# Patient Record
Sex: FEMALE | Race: WHITE | NOT HISPANIC OR LATINO | Employment: FULL TIME | URBAN - METROPOLITAN AREA
[De-identification: names, ages, dates, MRNs, and addresses within clinical notes are randomized per-mention and may not be internally consistent; named-entity substitution may affect disease eponyms.]

---

## 2021-10-02 ENCOUNTER — APPOINTMENT (OUTPATIENT)
Dept: CT IMAGING | Facility: HOSPITAL | Age: 61
End: 2021-10-02

## 2021-10-02 ENCOUNTER — APPOINTMENT (OUTPATIENT)
Dept: GENERAL RADIOLOGY | Facility: HOSPITAL | Age: 61
End: 2021-10-02

## 2021-10-02 ENCOUNTER — HOSPITAL ENCOUNTER (EMERGENCY)
Facility: HOSPITAL | Age: 61
Discharge: HOME | End: 2021-10-02
Attending: EMERGENCY MEDICINE | Admitting: EMERGENCY MEDICINE

## 2021-10-02 VITALS
RESPIRATION RATE: 16 BRPM | OXYGEN SATURATION: 99 % | TEMPERATURE: 98 F | SYSTOLIC BLOOD PRESSURE: 118 MMHG | DIASTOLIC BLOOD PRESSURE: 79 MMHG | HEART RATE: 84 BPM

## 2021-10-02 DIAGNOSIS — N39.0 URINARY TRACT INFECTION WITHOUT HEMATURIA, SITE UNSPECIFIED: ICD-10-CM

## 2021-10-02 DIAGNOSIS — U07.1 COVID-19: Primary | ICD-10-CM

## 2021-10-02 LAB
ALBUMIN SERPL-MCNC: 4.5 G/DL (ref 3.5–5.2)
ALBUMIN/GLOB SERPL: 1.5 G/DL
ALP SERPL-CCNC: 75 U/L (ref 39–117)
ALT SERPL W P-5'-P-CCNC: 21 U/L (ref 1–33)
ANION GAP SERPL CALCULATED.3IONS-SCNC: 12.4 MMOL/L (ref 5–15)
AST SERPL-CCNC: 25 U/L (ref 1–32)
B PARAPERT DNA SPEC QL NAA+PROBE: NOT DETECTED
B PERT DNA SPEC QL NAA+PROBE: NOT DETECTED
BACTERIA UR QL AUTO: ABNORMAL /HPF
BASOPHILS # BLD AUTO: 0.03 10*3/MM3 (ref 0–0.2)
BASOPHILS NFR BLD AUTO: 0.6 % (ref 0–1.5)
BILIRUB SERPL-MCNC: 0.2 MG/DL (ref 0–1.2)
BILIRUB UR QL STRIP: NEGATIVE
BUN SERPL-MCNC: 12 MG/DL (ref 8–23)
BUN/CREAT SERPL: 13 (ref 7–25)
C PNEUM DNA NPH QL NAA+NON-PROBE: NOT DETECTED
CALCIUM SPEC-SCNC: 9.4 MG/DL (ref 8.6–10.5)
CHLORIDE SERPL-SCNC: 102 MMOL/L (ref 98–107)
CLARITY UR: CLEAR
CO2 SERPL-SCNC: 21.6 MMOL/L (ref 22–29)
COLOR UR: ABNORMAL
CREAT SERPL-MCNC: 0.92 MG/DL (ref 0.57–1)
D-LACTATE SERPL-SCNC: 0.9 MMOL/L (ref 0.5–2)
DEPRECATED RDW RBC AUTO: 44.5 FL (ref 37–54)
EOSINOPHIL # BLD AUTO: 0.05 10*3/MM3 (ref 0–0.4)
EOSINOPHIL NFR BLD AUTO: 1 % (ref 0.3–6.2)
ERYTHROCYTE [DISTWIDTH] IN BLOOD BY AUTOMATED COUNT: 13.6 % (ref 12.3–15.4)
FLUAV SUBTYP SPEC NAA+PROBE: NOT DETECTED
FLUBV RNA ISLT QL NAA+PROBE: NOT DETECTED
GFR SERPL CREATININE-BSD FRML MDRD: 62 ML/MIN/1.73
GLOBULIN UR ELPH-MCNC: 3.1 GM/DL
GLUCOSE SERPL-MCNC: 96 MG/DL (ref 65–99)
GLUCOSE UR STRIP-MCNC: NEGATIVE MG/DL
HADV DNA SPEC NAA+PROBE: NOT DETECTED
HCOV 229E RNA SPEC QL NAA+PROBE: NOT DETECTED
HCOV HKU1 RNA SPEC QL NAA+PROBE: NOT DETECTED
HCOV NL63 RNA SPEC QL NAA+PROBE: NOT DETECTED
HCOV OC43 RNA SPEC QL NAA+PROBE: NOT DETECTED
HCT VFR BLD AUTO: 48.8 % (ref 34–46.6)
HGB BLD-MCNC: 16.1 G/DL (ref 12–15.9)
HGB UR QL STRIP.AUTO: ABNORMAL
HMPV RNA NPH QL NAA+NON-PROBE: NOT DETECTED
HPIV1 RNA SPEC QL NAA+PROBE: NOT DETECTED
HPIV2 RNA SPEC QL NAA+PROBE: NOT DETECTED
HPIV3 RNA NPH QL NAA+PROBE: NOT DETECTED
HPIV4 P GENE NPH QL NAA+PROBE: NOT DETECTED
HYALINE CASTS UR QL AUTO: ABNORMAL /LPF
IMM GRANULOCYTES # BLD AUTO: 0.03 10*3/MM3 (ref 0–0.05)
IMM GRANULOCYTES NFR BLD AUTO: 0.6 % (ref 0–0.5)
INR PPP: 0.91 (ref 0.9–1.1)
KETONES UR QL STRIP: ABNORMAL
LEUKOCYTE ESTERASE UR QL STRIP.AUTO: ABNORMAL
LYMPHOCYTES # BLD AUTO: 1.59 10*3/MM3 (ref 0.7–3.1)
LYMPHOCYTES NFR BLD AUTO: 31.2 % (ref 19.6–45.3)
M PNEUMO IGG SER IA-ACNC: NOT DETECTED
MCH RBC QN AUTO: 29.2 PG (ref 26.6–33)
MCHC RBC AUTO-ENTMCNC: 33 G/DL (ref 31.5–35.7)
MCV RBC AUTO: 88.4 FL (ref 79–97)
MONOCYTES # BLD AUTO: 0.77 10*3/MM3 (ref 0.1–0.9)
MONOCYTES NFR BLD AUTO: 15.1 % (ref 5–12)
NEUTROPHILS NFR BLD AUTO: 2.62 10*3/MM3 (ref 1.7–7)
NEUTROPHILS NFR BLD AUTO: 51.5 % (ref 42.7–76)
NITRITE UR QL STRIP: POSITIVE
NRBC BLD AUTO-RTO: 0 /100 WBC (ref 0–0.2)
NT-PROBNP SERPL-MCNC: 50.3 PG/ML (ref 0–900)
PH UR STRIP.AUTO: <=5 [PH] (ref 5–8)
PLATELET # BLD AUTO: 262 10*3/MM3 (ref 140–450)
PMV BLD AUTO: 10.2 FL (ref 6–12)
POTASSIUM SERPL-SCNC: 4.1 MMOL/L (ref 3.5–5.2)
PROCALCITONIN SERPL-MCNC: 0.04 NG/ML (ref 0–0.25)
PROT SERPL-MCNC: 7.6 G/DL (ref 6–8.5)
PROT UR QL STRIP: NEGATIVE
PROTHROMBIN TIME: 12.1 SECONDS (ref 11.7–14.2)
QT INTERVAL: 377 MS
RBC # BLD AUTO: 5.52 10*6/MM3 (ref 3.77–5.28)
RBC # UR: ABNORMAL /HPF
REF LAB TEST METHOD: ABNORMAL
RHINOVIRUS RNA SPEC NAA+PROBE: NOT DETECTED
RSV RNA NPH QL NAA+NON-PROBE: NOT DETECTED
SARS-COV-2 RNA NPH QL NAA+NON-PROBE: DETECTED
SODIUM SERPL-SCNC: 136 MMOL/L (ref 136–145)
SP GR UR STRIP: 1.01 (ref 1–1.03)
SQUAMOUS #/AREA URNS HPF: ABNORMAL /HPF
TROPONIN T SERPL-MCNC: <0.01 NG/ML (ref 0–0.03)
UROBILINOGEN UR QL STRIP: ABNORMAL
WBC # BLD AUTO: 5.09 10*3/MM3 (ref 3.4–10.8)
WBC UR QL AUTO: ABNORMAL /HPF

## 2021-10-02 PROCEDURE — 85610 PROTHROMBIN TIME: CPT | Performed by: EMERGENCY MEDICINE

## 2021-10-02 PROCEDURE — 71045 X-RAY EXAM CHEST 1 VIEW: CPT

## 2021-10-02 PROCEDURE — 83605 ASSAY OF LACTIC ACID: CPT | Performed by: EMERGENCY MEDICINE

## 2021-10-02 PROCEDURE — 96374 THER/PROPH/DIAG INJ IV PUSH: CPT

## 2021-10-02 PROCEDURE — 74176 CT ABD & PELVIS W/O CONTRAST: CPT

## 2021-10-02 PROCEDURE — 25010000002 KETOROLAC TROMETHAMINE PER 15 MG: Performed by: EMERGENCY MEDICINE

## 2021-10-02 PROCEDURE — 25010000002 MORPHINE PER 10 MG: Performed by: EMERGENCY MEDICINE

## 2021-10-02 PROCEDURE — 84484 ASSAY OF TROPONIN QUANT: CPT | Performed by: EMERGENCY MEDICINE

## 2021-10-02 PROCEDURE — 84145 PROCALCITONIN (PCT): CPT | Performed by: EMERGENCY MEDICINE

## 2021-10-02 PROCEDURE — 85025 COMPLETE CBC W/AUTO DIFF WBC: CPT | Performed by: EMERGENCY MEDICINE

## 2021-10-02 PROCEDURE — 25010000002 ONDANSETRON PER 1 MG: Performed by: EMERGENCY MEDICINE

## 2021-10-02 PROCEDURE — 81001 URINALYSIS AUTO W/SCOPE: CPT | Performed by: EMERGENCY MEDICINE

## 2021-10-02 PROCEDURE — 93010 ELECTROCARDIOGRAM REPORT: CPT | Performed by: INTERNAL MEDICINE

## 2021-10-02 PROCEDURE — 83880 ASSAY OF NATRIURETIC PEPTIDE: CPT | Performed by: EMERGENCY MEDICINE

## 2021-10-02 PROCEDURE — 0202U NFCT DS 22 TRGT SARS-COV-2: CPT | Performed by: EMERGENCY MEDICINE

## 2021-10-02 PROCEDURE — 96376 TX/PRO/DX INJ SAME DRUG ADON: CPT

## 2021-10-02 PROCEDURE — 93005 ELECTROCARDIOGRAM TRACING: CPT | Performed by: EMERGENCY MEDICINE

## 2021-10-02 PROCEDURE — 99283 EMERGENCY DEPT VISIT LOW MDM: CPT

## 2021-10-02 PROCEDURE — 96375 TX/PRO/DX INJ NEW DRUG ADDON: CPT

## 2021-10-02 PROCEDURE — 80053 COMPREHEN METABOLIC PANEL: CPT | Performed by: EMERGENCY MEDICINE

## 2021-10-02 RX ORDER — KETOROLAC TROMETHAMINE 15 MG/ML
15 INJECTION, SOLUTION INTRAMUSCULAR; INTRAVENOUS ONCE
Status: COMPLETED | OUTPATIENT
Start: 2021-10-02 | End: 2021-10-02

## 2021-10-02 RX ORDER — ONDANSETRON 4 MG/1
4 TABLET, ORALLY DISINTEGRATING ORAL EVERY 8 HOURS PRN
Qty: 9 TABLET | Refills: 0 | Status: SHIPPED | OUTPATIENT
Start: 2021-10-02 | End: 2022-04-05

## 2021-10-02 RX ORDER — SODIUM CHLORIDE 0.9 % (FLUSH) 0.9 %
10 SYRINGE (ML) INJECTION AS NEEDED
Status: DISCONTINUED | OUTPATIENT
Start: 2021-10-02 | End: 2021-10-02 | Stop reason: HOSPADM

## 2021-10-02 RX ORDER — ONDANSETRON 2 MG/ML
4 INJECTION INTRAMUSCULAR; INTRAVENOUS ONCE
Status: COMPLETED | OUTPATIENT
Start: 2021-10-02 | End: 2021-10-02

## 2021-10-02 RX ORDER — MORPHINE SULFATE 2 MG/ML
2 INJECTION, SOLUTION INTRAMUSCULAR; INTRAVENOUS ONCE
Status: COMPLETED | OUTPATIENT
Start: 2021-10-02 | End: 2021-10-02

## 2021-10-02 RX ORDER — MORPHINE SULFATE 2 MG/ML
4 INJECTION, SOLUTION INTRAMUSCULAR; INTRAVENOUS ONCE
Status: COMPLETED | OUTPATIENT
Start: 2021-10-02 | End: 2021-10-02

## 2021-10-02 RX ORDER — NAPROXEN 500 MG/1
500 TABLET ORAL 2 TIMES DAILY PRN
Qty: 20 TABLET | Refills: 0 | Status: SHIPPED | OUTPATIENT
Start: 2021-10-02 | End: 2022-04-05

## 2021-10-02 RX ORDER — CEPHALEXIN 500 MG/1
500 CAPSULE ORAL 2 TIMES DAILY
Qty: 14 CAPSULE | Refills: 0 | Status: SHIPPED | OUTPATIENT
Start: 2021-10-02 | End: 2021-10-09

## 2021-10-02 RX ADMIN — MORPHINE SULFATE 2 MG: 2 INJECTION, SOLUTION INTRAMUSCULAR; INTRAVENOUS at 02:29

## 2021-10-02 RX ADMIN — KETOROLAC TROMETHAMINE 15 MG: 15 INJECTION, SOLUTION INTRAMUSCULAR; INTRAVENOUS at 06:14

## 2021-10-02 RX ADMIN — MORPHINE SULFATE 4 MG: 2 INJECTION, SOLUTION INTRAMUSCULAR; INTRAVENOUS at 03:05

## 2021-10-02 RX ADMIN — SODIUM CHLORIDE 500 ML: 9 INJECTION, SOLUTION INTRAVENOUS at 02:30

## 2021-10-02 RX ADMIN — ONDANSETRON 4 MG: 2 INJECTION INTRAMUSCULAR; INTRAVENOUS at 02:30

## 2021-10-02 NOTE — ED NOTES
This RN in appropriate PPE for all patient care interactions. Pt masked and redirected for proper mask use when necessary. Hand hygiene performed before and after all patient care interactions.         Art Abrams, RN  10/02/21 0506

## 2021-10-02 NOTE — ED NOTES
Pt reports bilateral hip pain, bilateral leg pain, productive cough, blood in the urine, muscle aches, fever and chills. Pt was tested for covid yesterday and it was negative. PCP sent to ER to be evaluated. Pt is not vaccinated and was recently exposed to covid via her niece.      Sosa Sol, RN  10/02/21 0043       Sosa Sol RN  10/02/21 0048

## 2021-10-02 NOTE — ED PROVIDER NOTES
EMERGENCY DEPARTMENT ENCOUNTER    Room Number:  09/09  Date of encounter:  10/2/2021  PCP: Janell Dangelo APRN  Historian: Patient      HPI:  Chief Complaint: Body aches flank pain  A complete HPI/ROS/PMH/PSH/SH/FH are unobtainable due to: None    Context: Francisca Fields is a 60 y.o. female who presents to the ED c/o 2 days of left flank pain, body aches, chills, several days of cough that is minimally productive of green mucus states is like prior bronchitis.  Patient was exposed to Covid recently via her niece.  Had a negative Covid test yesterday.      PAST MEDICAL HISTORY  Active Ambulatory Problems     Diagnosis Date Noted   • No Active Ambulatory Problems     Resolved Ambulatory Problems     Diagnosis Date Noted   • No Resolved Ambulatory Problems     No Additional Past Medical History         PAST SURGICAL HISTORY  No past surgical history on file.      FAMILY HISTORY  No family history on file.      SOCIAL HISTORY  Social History     Socioeconomic History   • Marital status: Single     Spouse name: Not on file   • Number of children: Not on file   • Years of education: Not on file   • Highest education level: Not on file         ALLERGIES  Codeine        REVIEW OF SYSTEMS  Review of Systems     All systems reviewed and negative except for those discussed in HPI.       PHYSICAL EXAM    I have reviewed the triage vital signs and nursing notes.    ED Triage Vitals [10/02/21 0045]   Temp Heart Rate Resp BP SpO2   97.1 °F (36.2 °C) 82 19 -- 97 %      Temp src Heart Rate Source Patient Position BP Location FiO2 (%)   -- -- -- -- --       Physical Exam  GENERAL: Mild distressed  HENT: nares patent  EYES: no scleral icterus  CV: regular rhythm, regular rate  RESPIRATORY: normal effort, clear auscultation bilaterally  ABDOMEN: soft, left flank tenderness, nondistended  MUSCULOSKELETAL: no deformity  NEURO: alert, moves all extremities, follows commands  SKIN: warm, dry        LAB RESULTS  Recent Results  (from the past 24 hour(s))   ECG 12 Lead    Collection Time: 10/02/21  2:20 AM   Result Value Ref Range    QT Interval 377 ms   Comprehensive Metabolic Panel    Collection Time: 10/02/21  2:28 AM    Specimen: Blood   Result Value Ref Range    Glucose 96 65 - 99 mg/dL    BUN 12 8 - 23 mg/dL    Creatinine 0.92 0.57 - 1.00 mg/dL    Sodium 136 136 - 145 mmol/L    Potassium 4.1 3.5 - 5.2 mmol/L    Chloride 102 98 - 107 mmol/L    CO2 21.6 (L) 22.0 - 29.0 mmol/L    Calcium 9.4 8.6 - 10.5 mg/dL    Total Protein 7.6 6.0 - 8.5 g/dL    Albumin 4.50 3.50 - 5.20 g/dL    ALT (SGPT) 21 1 - 33 U/L    AST (SGOT) 25 1 - 32 U/L    Alkaline Phosphatase 75 39 - 117 U/L    Total Bilirubin 0.2 0.0 - 1.2 mg/dL    eGFR Non African Amer 62 >60 mL/min/1.73    Globulin 3.1 gm/dL    A/G Ratio 1.5 g/dL    BUN/Creatinine Ratio 13.0 7.0 - 25.0    Anion Gap 12.4 5.0 - 15.0 mmol/L   Protime-INR    Collection Time: 10/02/21  2:28 AM    Specimen: Blood   Result Value Ref Range    Protime 12.1 11.7 - 14.2 Seconds    INR 0.91 0.90 - 1.10   BNP    Collection Time: 10/02/21  2:28 AM    Specimen: Blood   Result Value Ref Range    proBNP 50.3 0.0 - 900.0 pg/mL   Troponin    Collection Time: 10/02/21  2:28 AM    Specimen: Blood   Result Value Ref Range    Troponin T <0.010 0.000 - 0.030 ng/mL   Lactic Acid, Plasma    Collection Time: 10/02/21  2:28 AM    Specimen: Blood   Result Value Ref Range    Lactate 0.9 0.5 - 2.0 mmol/L   Procalcitonin    Collection Time: 10/02/21  2:28 AM    Specimen: Blood   Result Value Ref Range    Procalcitonin 0.04 0.00 - 0.25 ng/mL   CBC Auto Differential    Collection Time: 10/02/21  2:28 AM    Specimen: Blood   Result Value Ref Range    WBC 5.09 3.40 - 10.80 10*3/mm3    RBC 5.52 (H) 3.77 - 5.28 10*6/mm3    Hemoglobin 16.1 (H) 12.0 - 15.9 g/dL    Hematocrit 48.8 (H) 34.0 - 46.6 %    MCV 88.4 79.0 - 97.0 fL    MCH 29.2 26.6 - 33.0 pg    MCHC 33.0 31.5 - 35.7 g/dL    RDW 13.6 12.3 - 15.4 %    RDW-SD 44.5 37.0 - 54.0 fl    MPV  10.2 6.0 - 12.0 fL    Platelets 262 140 - 450 10*3/mm3    Neutrophil % 51.5 42.7 - 76.0 %    Lymphocyte % 31.2 19.6 - 45.3 %    Monocyte % 15.1 (H) 5.0 - 12.0 %    Eosinophil % 1.0 0.3 - 6.2 %    Basophil % 0.6 0.0 - 1.5 %    Immature Grans % 0.6 (H) 0.0 - 0.5 %    Neutrophils, Absolute 2.62 1.70 - 7.00 10*3/mm3    Lymphocytes, Absolute 1.59 0.70 - 3.10 10*3/mm3    Monocytes, Absolute 0.77 0.10 - 0.90 10*3/mm3    Eosinophils, Absolute 0.05 0.00 - 0.40 10*3/mm3    Basophils, Absolute 0.03 0.00 - 0.20 10*3/mm3    Immature Grans, Absolute 0.03 0.00 - 0.05 10*3/mm3    nRBC 0.0 0.0 - 0.2 /100 WBC   Respiratory Panel PCR w/COVID-19(SARS-CoV-2) ADRIAN/SATYA/ADIEL/PAD/COR/MAD/SHANKAR In-House, NP Swab in UT/VTM, 3-4 HR TAT - Swab, Nasopharynx    Collection Time: 10/02/21  2:29 AM    Specimen: Nasopharynx; Swab   Result Value Ref Range    ADENOVIRUS, PCR      Coronavirus 229E      Coronavirus HKU1      Coronavirus NL63      Coronavirus OC43      COVID19 Detected (C) Not Detected - Ref. Range    Human Metapneumovirus      Human Rhinovirus/Enterovirus      Influenza B PCR      Parainfluenza Virus 1      Parainfluenza Virus 2      Parainfluenza Virus 3      Parainfluenza Virus 4      RSV, PCR      Bordetella pertussis pcr      Bordetella parapertussis PCR      Chlamydophila pneumoniae PCR      Mycoplasma pneumo by PCR     Urinalysis With Microscopic If Indicated (No Culture) - Urine, Clean Catch    Collection Time: 10/02/21  5:11 AM    Specimen: Urine, Clean Catch   Result Value Ref Range    Color, UA Orange (A) Yellow, Straw    Appearance, UA Clear Clear    pH, UA <=5.0 5.0 - 8.0    Specific Gravity, UA 1.012 1.005 - 1.030    Glucose, UA Negative Negative    Ketones, UA Trace (A) Negative    Bilirubin, UA Negative Negative    Blood, UA Trace (A) Negative    Protein, UA Negative Negative    Leuk Esterase, UA Trace (A) Negative    Nitrite, UA Positive (A) Negative    Urobilinogen, UA 1.0 E.U./dL 0.2 - 1.0 E.U./dL   Urinalysis, Microscopic  Only - Urine, Clean Catch    Collection Time: 10/02/21  5:11 AM    Specimen: Urine, Clean Catch   Result Value Ref Range    RBC, UA 0-2 None Seen, 0-2 /HPF    WBC, UA 3-5 (A) None Seen, 0-2 /HPF    Bacteria, UA None Seen None Seen /HPF    Squamous Epithelial Cells, UA 0-2 None Seen, 0-2 /HPF    Hyaline Casts, UA None Seen None Seen /LPF    Methodology Automated Microscopy        Ordered the above labs and independently reviewed the results.        RADIOLOGY  CT Abdomen Pelvis Without Contrast    Result Date: 10/2/2021  CT OF THE ABDOMEN AND PELVIS WITHOUT CONTRAST  HISTORY: Left flank pain  COMPARISON: None available.  TECHNIQUE: Axial CT imaging was obtained through the abdomen and pelvis. No IV contrast was administered.  FINDINGS: Images through the lung bases demonstrate some bibasilar groundglass infiltrates. These are in a dependent distribution, which would be more suggestive of atelectasis. No acute osseous abnormalities are seen. Images are degraded by motion artifact. Stomach and duodenum are unremarkable, as are the gallbladder, liver, adrenal glands, spleen, and pancreas. There is no hydronephrosis. No distal ureteral or bladder stones are seen. Uterus and ovaries appear normal. There is colonic diverticulosis. No diverticulitis is seen. There is no evidence of appendicitis. There is no bowel obstruction.      No acute intra-abdominal or intrapelvic process identified.  Radiation dose reduction techniques were utilized, including automated exposure control and exposure modulation based on body size.  This report was finalized on 10/2/2021 4:19 AM by Dr. Rosa Isela Pierre M.D.      XR Chest 1 View    Result Date: 10/2/2021  SINGLE VIEW OF THE CHEST  HISTORY: Shortness of air  COMPARISON: None available.  FINDINGS: Heart size is within normal limits. There is mild bibasilar atelectasis. No pneumothorax, pleural effusion, or acute infiltrate is seen.      Mild bibasilar atelectasis, more significant on the  left.  This report was finalized on 10/2/2021 2:17 AM by Dr. Rosa Isela Pierre M.D.        I ordered the above noted radiological studies. Reviewed by me and discussed with radiologist.  See dictation for official radiology interpretation.      PROCEDURES    Procedures      MEDICATIONS GIVEN IN ER    Medications   sodium chloride 0.9 % flush 10 mL (has no administration in time range)   morphine injection 2 mg (2 mg Intravenous Given 10/2/21 0229)   ondansetron (ZOFRAN) injection 4 mg (4 mg Intravenous Given 10/2/21 0230)   sodium chloride 0.9 % bolus 500 mL (0 mL Intravenous Stopped 10/2/21 0307)   morphine injection 4 mg (4 mg Intravenous Given 10/2/21 0305)   ketorolac (TORADOL) injection 15 mg (15 mg Intravenous Given 10/2/21 0614)         PROGRESS, DATA ANALYSIS, CONSULTS, AND MEDICAL DECISION MAKING    All labs have been independently reviewed by me.  All radiology studies have been reviewed by me and discussed with radiologist dictating the report.   EKG's independently viewed and interpreted by me.  Discussion below represents my analysis of pertinent findings related to patient's condition, differential diagnosis, treatment plan and final disposition.        ED Course as of Oct 02 0625   Sat Oct 02, 2021   0222 EKG          EKG time: 0 220  Rhythm/Rate: Sinus rhythm rate 78  P waves and SC: Normal  QRS, axis: Narrow regular  ST and T waves: Normal    Interpreted Contemporaneously by me, independently viewed    [TJ]      ED Course User Index  [TJ] Paco Monson MD           PPE: The patient wore a surgical mask throughout the entire patient encounter. I wore an N95.    AS OF 06:25 EDT VITALS:    BP - 117/76  HR - 82  TEMP - 97.1 °F (36.2 °C)  O2 SATS - 97%        DIAGNOSIS  Final diagnoses:   COVID-19   Urinary tract infection without hematuria, site unspecified         DISPOSITION  Discharge           Paco Monson MD  10/02/21 0625

## 2022-04-05 ENCOUNTER — OFFICE VISIT (OUTPATIENT)
Dept: FAMILY MEDICINE CLINIC | Facility: CLINIC | Age: 62
End: 2022-04-05

## 2022-04-05 VITALS
RESPIRATION RATE: 19 BRPM | TEMPERATURE: 97.5 F | WEIGHT: 172 LBS | DIASTOLIC BLOOD PRESSURE: 72 MMHG | OXYGEN SATURATION: 98 % | HEART RATE: 100 BPM | SYSTOLIC BLOOD PRESSURE: 120 MMHG | HEIGHT: 62 IN | BODY MASS INDEX: 31.65 KG/M2

## 2022-04-05 DIAGNOSIS — F17.200 SMOKER: ICD-10-CM

## 2022-04-05 DIAGNOSIS — Z00.00 ANNUAL PHYSICAL EXAM: ICD-10-CM

## 2022-04-05 DIAGNOSIS — M19.90 ARTHRITIS: ICD-10-CM

## 2022-04-05 DIAGNOSIS — E66.9 OBESITY (BMI 30.0-34.9): ICD-10-CM

## 2022-04-05 DIAGNOSIS — H61.21 IMPACTED CERUMEN OF RIGHT EAR: ICD-10-CM

## 2022-04-05 DIAGNOSIS — M51.36 DEGENERATIVE DISC DISEASE, LUMBAR: ICD-10-CM

## 2022-04-05 DIAGNOSIS — K76.0 FATTY LIVER: ICD-10-CM

## 2022-04-05 DIAGNOSIS — Z76.89 ENCOUNTER TO ESTABLISH CARE: Primary | ICD-10-CM

## 2022-04-05 PROCEDURE — 69209 REMOVE IMPACTED EAR WAX UNI: CPT | Performed by: STUDENT IN AN ORGANIZED HEALTH CARE EDUCATION/TRAINING PROGRAM

## 2022-04-05 PROCEDURE — 2014F MENTAL STATUS ASSESS: CPT | Performed by: STUDENT IN AN ORGANIZED HEALTH CARE EDUCATION/TRAINING PROGRAM

## 2022-04-05 PROCEDURE — 99386 PREV VISIT NEW AGE 40-64: CPT | Performed by: STUDENT IN AN ORGANIZED HEALTH CARE EDUCATION/TRAINING PROGRAM

## 2022-04-05 PROCEDURE — 3008F BODY MASS INDEX DOCD: CPT | Performed by: STUDENT IN AN ORGANIZED HEALTH CARE EDUCATION/TRAINING PROGRAM

## 2022-04-05 NOTE — PROGRESS NOTES
"Chief Complaint  Establishing care/annual physical    Subjective         Francisca Fields is a 61 y.o. female who presents to [facility]  61 years old female with past medical history of lower back pain, smoking, fatty liver, arthritis, obesity comes to the clinic today to establish care and annual physical.    Current smoker; patient declined low-dose CT for lung cancer screening.    Reports colonoscopy was done 3 years ago, does not want to continue with colonoscopy.  Does not want any mammogram/Pap smear or any other preventive cares.    Lower back pain; following up with pain management at [place].    Denies any chest pain or shortness of breath at rest, patient is physically very active.    Review of Systems   Objective   Vital Signs:   Vitals:    04/05/22 1420   BP: 120/72   Pulse: 100   Resp: 19   Temp: 97.5 °F (36.4 °C)   SpO2: 98%   Weight: 78 kg (172 lb)   Height: 157.5 cm (62\")      Body mass index is 31.46 kg/m².   Physical Exam  Vitals reviewed.   Constitutional:       Appearance: Normal appearance. She is well-developed.   HENT:      Head: Normocephalic and atraumatic.      Right Ear: External ear normal.      Left Ear: External ear normal.      Mouth/Throat:      Pharynx: No oropharyngeal exudate.   Eyes:      Conjunctiva/sclera: Conjunctivae normal.      Pupils: Pupils are equal, round, and reactive to light.   Cardiovascular:      Rate and Rhythm: Normal rate and regular rhythm.      Heart sounds: No murmur heard.    No friction rub. No gallop.   Pulmonary:      Effort: Pulmonary effort is normal.      Breath sounds: Normal breath sounds. No wheezing or rhonchi.   Abdominal:      General: Bowel sounds are normal. There is no distension.      Palpations: Abdomen is soft.      Tenderness: There is no abdominal tenderness.   Skin:     General: Skin is warm and dry.   Neurological:      Mental Status: She is alert and oriented to person, place, and time.      " Cranial Nerves: No cranial nerve deficit.   Psychiatric:         Mood and Affect: Mood and affect normal.         Behavior: Behavior normal.         Thought Content: Thought content normal.         Judgment: Judgment normal.              Ear Cerumen Removal    Date/Time: 4/5/2022 3:03 PM  Performed by: Arian Ching MD  Authorized by: Arian Ching MD     Anesthesia:  Local Anesthetic: none  Location details: right ear  Procedure type: irrigation   Sedation:  Patient sedated: no               Assessment and Plan   Diagnoses and all orders for this visit:    1. Encounter to establish care (Primary)  -     TSH Rfx On Abnormal To Free T4; Future  -     CBC & Differential; Future  -     Comprehensive Metabolic Panel; Future  -     Hemoglobin A1c; Future  -     Lipid Panel; Future    2. Annual physical exam  Comments:  declined to c/w preventive care,   Orders:  -     TSH Rfx On Abnormal To Free T4; Future  -     CBC & Differential; Future  -     Comprehensive Metabolic Panel; Future  -     Hemoglobin A1c; Future  -     Lipid Panel; Future    3. Degenerative disc disease, lumbar  -     TSH Rfx On Abnormal To Free T4; Future  -     CBC & Differential; Future  -     Comprehensive Metabolic Panel; Future  -     Hemoglobin A1c; Future  -     Lipid Panel; Future    4. Arthritis  -     TSH Rfx On Abnormal To Free T4; Future  -     CBC & Differential; Future  -     Comprehensive Metabolic Panel; Future  -     Hemoglobin A1c; Future  -     Lipid Panel; Future    5. Fatty liver  -     TSH Rfx On Abnormal To Free T4; Future  -     CBC & Differential; Future  -     Comprehensive Metabolic Panel; Future  -     Hemoglobin A1c; Future  -     Lipid Panel; Future    6. Smoker  Comments:  declined LDCT screening; risks discussed   Orders:  -     TSH Rfx On Abnormal To Free T4; Future  -     CBC & Differential; Future  -     Comprehensive Metabolic Panel; Future  -     Hemoglobin A1c; Future  -     Lipid Panel; Future    7. Obesity (BMI  30.0-34.9)  -     TSH Rfx On Abnormal To Free T4; Future  -     CBC & Differential; Future  -     Comprehensive Metabolic Panel; Future  -     Hemoglobin A1c; Future  -     Lipid Panel; Future    8. Impacted cerumen of right ear  Comments:  Irrigation performed    Other orders  -     Ear Cerumen Removal            Follow Up   Return in about 6 months (around 10/5/2022) for Recheck, Next scheduled follow up.  Patient was given instructions and counseling regarding her condition or for health maintenance advice. Please see specific information pulled into the AVS if appropriate.

## 2022-04-20 ENCOUNTER — LAB (OUTPATIENT)
Dept: LAB | Facility: HOSPITAL | Age: 62
End: 2022-04-20

## 2022-04-20 DIAGNOSIS — M51.36 DEGENERATIVE DISC DISEASE, LUMBAR: ICD-10-CM

## 2022-04-20 DIAGNOSIS — K76.0 FATTY LIVER: ICD-10-CM

## 2022-04-20 DIAGNOSIS — M19.90 ARTHRITIS: ICD-10-CM

## 2022-04-20 DIAGNOSIS — E66.9 OBESITY (BMI 30.0-34.9): ICD-10-CM

## 2022-04-20 DIAGNOSIS — Z00.00 ANNUAL PHYSICAL EXAM: ICD-10-CM

## 2022-04-20 DIAGNOSIS — Z76.89 ENCOUNTER TO ESTABLISH CARE: ICD-10-CM

## 2022-04-20 DIAGNOSIS — F17.200 SMOKER: ICD-10-CM

## 2022-04-20 LAB
ALBUMIN SERPL-MCNC: 4.2 G/DL (ref 3.5–5.2)
ALBUMIN/GLOB SERPL: 1.3 G/DL
ALP SERPL-CCNC: 75 U/L (ref 39–117)
ALT SERPL W P-5'-P-CCNC: 20 U/L (ref 1–33)
ANION GAP SERPL CALCULATED.3IONS-SCNC: 8.4 MMOL/L (ref 5–15)
AST SERPL-CCNC: 16 U/L (ref 1–32)
BASOPHILS # BLD AUTO: 0.04 10*3/MM3 (ref 0–0.2)
BASOPHILS NFR BLD AUTO: 0.5 % (ref 0–1.5)
BILIRUB SERPL-MCNC: 0.2 MG/DL (ref 0–1.2)
BUN SERPL-MCNC: 19 MG/DL (ref 8–23)
BUN/CREAT SERPL: 22.4 (ref 7–25)
CALCIUM SPEC-SCNC: 9.3 MG/DL (ref 8.6–10.5)
CHLORIDE SERPL-SCNC: 107 MMOL/L (ref 98–107)
CHOLEST SERPL-MCNC: 198 MG/DL (ref 0–200)
CO2 SERPL-SCNC: 22.6 MMOL/L (ref 22–29)
CREAT SERPL-MCNC: 0.85 MG/DL (ref 0.57–1)
DEPRECATED RDW RBC AUTO: 40.4 FL (ref 37–54)
EGFRCR SERPLBLD CKD-EPI 2021: 78.1 ML/MIN/1.73
EOSINOPHIL # BLD AUTO: 0.29 10*3/MM3 (ref 0–0.4)
EOSINOPHIL NFR BLD AUTO: 3.5 % (ref 0.3–6.2)
ERYTHROCYTE [DISTWIDTH] IN BLOOD BY AUTOMATED COUNT: 13 % (ref 12.3–15.4)
GLOBULIN UR ELPH-MCNC: 3.3 GM/DL
GLUCOSE SERPL-MCNC: 85 MG/DL (ref 65–99)
HBA1C MFR BLD: 5.9 % (ref 4.8–5.6)
HCT VFR BLD AUTO: 42.9 % (ref 34–46.6)
HDLC SERPL-MCNC: 65 MG/DL (ref 40–60)
HGB BLD-MCNC: 14.2 G/DL (ref 12–15.9)
IMM GRANULOCYTES # BLD AUTO: 0.04 10*3/MM3 (ref 0–0.05)
IMM GRANULOCYTES NFR BLD AUTO: 0.5 % (ref 0–0.5)
LDLC SERPL CALC-MCNC: 110 MG/DL (ref 0–100)
LDLC/HDLC SERPL: 1.64 {RATIO}
LYMPHOCYTES # BLD AUTO: 2.69 10*3/MM3 (ref 0.7–3.1)
LYMPHOCYTES NFR BLD AUTO: 32.2 % (ref 19.6–45.3)
MCH RBC QN AUTO: 28.6 PG (ref 26.6–33)
MCHC RBC AUTO-ENTMCNC: 33.1 G/DL (ref 31.5–35.7)
MCV RBC AUTO: 86.5 FL (ref 79–97)
MONOCYTES # BLD AUTO: 0.72 10*3/MM3 (ref 0.1–0.9)
MONOCYTES NFR BLD AUTO: 8.6 % (ref 5–12)
NEUTROPHILS NFR BLD AUTO: 4.58 10*3/MM3 (ref 1.7–7)
NEUTROPHILS NFR BLD AUTO: 54.7 % (ref 42.7–76)
NRBC BLD AUTO-RTO: 0 /100 WBC (ref 0–0.2)
PLATELET # BLD AUTO: 324 10*3/MM3 (ref 140–450)
PMV BLD AUTO: 10.4 FL (ref 6–12)
POTASSIUM SERPL-SCNC: 4.7 MMOL/L (ref 3.5–5.2)
PROT SERPL-MCNC: 7.5 G/DL (ref 6–8.5)
RBC # BLD AUTO: 4.96 10*6/MM3 (ref 3.77–5.28)
SODIUM SERPL-SCNC: 138 MMOL/L (ref 136–145)
TRIGL SERPL-MCNC: 133 MG/DL (ref 0–150)
TSH SERPL DL<=0.05 MIU/L-ACNC: 2.7 UIU/ML (ref 0.27–4.2)
VLDLC SERPL-MCNC: 23 MG/DL (ref 5–40)
WBC NRBC COR # BLD: 8.36 10*3/MM3 (ref 3.4–10.8)

## 2022-04-20 PROCEDURE — 84443 ASSAY THYROID STIM HORMONE: CPT

## 2022-04-20 PROCEDURE — 80053 COMPREHEN METABOLIC PANEL: CPT

## 2022-04-20 PROCEDURE — 36415 COLL VENOUS BLD VENIPUNCTURE: CPT

## 2022-04-20 PROCEDURE — 83036 HEMOGLOBIN GLYCOSYLATED A1C: CPT

## 2022-04-20 PROCEDURE — 80061 LIPID PANEL: CPT

## 2022-04-20 PROCEDURE — 85025 COMPLETE CBC W/AUTO DIFF WBC: CPT

## 2022-05-17 ENCOUNTER — OFFICE VISIT (OUTPATIENT)
Dept: FAMILY MEDICINE CLINIC | Facility: CLINIC | Age: 62
End: 2022-05-17

## 2022-05-17 VITALS
RESPIRATION RATE: 18 BRPM | HEIGHT: 62 IN | WEIGHT: 178.8 LBS | BODY MASS INDEX: 32.9 KG/M2 | OXYGEN SATURATION: 99 % | TEMPERATURE: 97.8 F | DIASTOLIC BLOOD PRESSURE: 62 MMHG | SYSTOLIC BLOOD PRESSURE: 122 MMHG | HEART RATE: 85 BPM

## 2022-05-17 DIAGNOSIS — E66.9 OBESITY (BMI 30.0-34.9): ICD-10-CM

## 2022-05-17 DIAGNOSIS — R39.9 LOWER URINARY TRACT SYMPTOMS (LUTS): Primary | ICD-10-CM

## 2022-05-17 DIAGNOSIS — Z79.899 MEDICATION MANAGEMENT: ICD-10-CM

## 2022-05-17 LAB
AMPHET+METHAMPHET UR QL: NEGATIVE
AMPHETAMINE INTERNAL CONTROL: ABNORMAL
AMPHETAMINES UR QL: NEGATIVE
BARBITURATE INTERNAL CONTROL: ABNORMAL
BARBITURATES UR QL SCN: NEGATIVE
BENZODIAZ UR QL SCN: NEGATIVE
BENZODIAZEPINE INTERNAL CONTROL: ABNORMAL
BILIRUB BLD-MCNC: NEGATIVE MG/DL
BUPRENORPHINE INTERNAL CONTROL: ABNORMAL
BUPRENORPHINE SERPL-MCNC: NEGATIVE NG/ML
CANNABINOIDS SERPL QL: NEGATIVE
CLARITY, POC: CLEAR
COCAINE INTERNAL CONTROL: ABNORMAL
COCAINE UR QL: NEGATIVE
COLOR UR: YELLOW
EXPIRATION DATE: NORMAL
GLUCOSE UR STRIP-MCNC: NEGATIVE MG/DL
KETONES UR QL: NEGATIVE
LEUKOCYTE EST, POC: NEGATIVE
Lab: NORMAL
MDMA (ECSTASY) INTERNAL CONTROL: ABNORMAL
MDMA UR QL SCN: NEGATIVE
METHADONE INTERNAL CONTROL: ABNORMAL
METHADONE UR QL SCN: NEGATIVE
METHAMPHETAMINE INTERNAL CONTROL: ABNORMAL
NITRITE UR-MCNC: NEGATIVE MG/ML
OPIATES INTERNAL CONTROL: ABNORMAL
OPIATES UR QL: NEGATIVE
OXYCODONE INTERNAL CONTROL: ABNORMAL
OXYCODONE UR QL SCN: NEGATIVE
PCP UR QL SCN: NEGATIVE
PH UR: 5.5 [PH] (ref 5–8)
PHENCYCLIDINE INTERNAL CONTROL: ABNORMAL
PROT UR STRIP-MCNC: NEGATIVE MG/DL
RBC # UR STRIP: NEGATIVE /UL
SP GR UR: 1.02 (ref 1–1.03)
THC INTERNAL CONTROL: ABNORMAL
UROBILINOGEN UR QL: NORMAL

## 2022-05-17 PROCEDURE — 99214 OFFICE O/P EST MOD 30 MIN: CPT | Performed by: STUDENT IN AN ORGANIZED HEALTH CARE EDUCATION/TRAINING PROGRAM

## 2022-05-17 PROCEDURE — 80305 DRUG TEST PRSMV DIR OPT OBS: CPT | Performed by: STUDENT IN AN ORGANIZED HEALTH CARE EDUCATION/TRAINING PROGRAM

## 2022-05-17 RX ORDER — PHENAZOPYRIDINE HYDROCHLORIDE 100 MG/1
100 TABLET, FILM COATED ORAL 3 TIMES DAILY PRN
Qty: 6 TABLET | Refills: 0 | Status: SHIPPED | OUTPATIENT
Start: 2022-05-17 | End: 2022-10-05

## 2022-05-17 RX ORDER — PHENTERMINE HYDROCHLORIDE 15 MG/1
15 CAPSULE ORAL EVERY MORNING
Qty: 30 CAPSULE | Refills: 1 | Status: SHIPPED | OUTPATIENT
Start: 2022-05-17 | End: 2022-10-05

## 2022-05-17 NOTE — PROGRESS NOTES
"Chief Complaint  Bladder irritation/obesity    Subjective         Francisca Fields is a 61 y.o. female who presents to [facility]  61 years old female comes to the clinic today to discuss new concerns.    Patient is complaining of 3 to 4 days history of mild lower urinary tract symptoms, increased frequency and mild burning.  Denies any discharge or any other symptoms.    Patient has used Azo over the weekend which improved her symptoms somewhat.    Patient also wants to discuss about obesity, has tried phentermine.  Last phentermine use was last year.  Patient wants to go back on phentermine.  Patient does report that she is aware of side effects of phentermine.  But her weight gain is causing lots of distress and anxiety, patient would like to try phentermine low-dose to help with weight and suppress appetite.    Review of Systems   Objective   Vital Signs:   Vitals:    05/17/22 0740   BP: 122/62   Pulse: 85   Resp: 18   Temp: 97.8 °F (36.6 °C)   SpO2: 99%   Weight: 81.1 kg (178 lb 12.8 oz)   Height: 157.5 cm (62\")      Body mass index is 32.7 kg/m².   Physical Exam  Constitutional:       General: She is awake.   Cardiovascular:      Rate and Rhythm: Normal rate and regular rhythm.   Abdominal:      Palpations: Abdomen is soft.      Tenderness: There is no abdominal tenderness.   Neurological:      Mental Status: She is alert.                       Assessment and Plan   Diagnoses and all orders for this visit:    1. Lower urinary tract symptoms (LUTS) (Primary)  Comments:  Normal urine analysis, will send Pyridium to help with irritation.  Recheck of urine needed if persistent symptoms  Orders:  -     POCT urinalysis dipstick, automated  -     Cancel: Urine Culture - Urine, Urine, Clean Catch  -     phenazopyridine (Pyridium) 100 MG tablet; Take 1 tablet by mouth 3 (Three) Times a Day As Needed for Bladder Spasms.  Dispense: 6 tablet; Refill: 0  -     POC Urine Drug Screen " [facility]    2. Obesity (BMI 30.0-34.9)  Comments:  Lifestyle modifications discussed.  Phentermine side effect including hypertrophic cardiomyopathy discussed.  Orders:  -     phenazopyridine (Pyridium) 100 MG tablet; Take 1 tablet by mouth 3 (Three) Times a Day As Needed for Bladder Spasms.  Dispense: 6 tablet; Refill: 0  -     POC Urine Drug Screen [facility]  -     phentermine 15 MG capsule; Take 1 capsule by mouth Every Morning.  Dispense: 30 capsule; Refill: 1    3. Medication management  -     phenazopyridine (Pyridium) 100 MG tablet; Take 1 tablet by mouth 3 (Three) Times a Day As Needed for Bladder Spasms.  Dispense: 6 tablet; Refill: 0  -     POC Urine Drug Screen [facility]  -     phentermine 15 MG capsule; Take 1 capsule by mouth Every Morning.  Dispense: 30 capsule; Refill: 1        Due to patient's age, I have recommended against using stimulant medications.  Due to significant distress patient is willing to take that risk.  We will start patient on phentermine low-dose, will not go on high-dose.  Only for short-term, no plan to reuse it in future.  Barak reviewed/contract signed/UDS reviewed.    Follow Up   Return in about 2 months (around 7/17/2022) for Recheck.  Patient was given instructions and counseling regarding her condition or for health maintenance advice. Please see specific information pulled into the AVS if appropriate.

## 2022-09-09 ENCOUNTER — TELEPHONE (OUTPATIENT)
Dept: FAMILY MEDICINE CLINIC | Facility: CLINIC | Age: 62
End: 2022-09-09

## 2022-09-09 DIAGNOSIS — Z12.11 SCREENING FOR COLON CANCER: Primary | ICD-10-CM

## 2022-10-05 ENCOUNTER — OFFICE VISIT (OUTPATIENT)
Dept: FAMILY MEDICINE CLINIC | Facility: CLINIC | Age: 62
End: 2022-10-05

## 2022-10-05 VITALS
BODY MASS INDEX: 32.2 KG/M2 | HEIGHT: 62 IN | SYSTOLIC BLOOD PRESSURE: 132 MMHG | OXYGEN SATURATION: 98 % | DIASTOLIC BLOOD PRESSURE: 76 MMHG | WEIGHT: 175 LBS | HEART RATE: 61 BPM | TEMPERATURE: 96.2 F

## 2022-10-05 DIAGNOSIS — M19.90 ARTHRITIS: Primary | ICD-10-CM

## 2022-10-05 DIAGNOSIS — F17.200 SMOKING: ICD-10-CM

## 2022-10-05 DIAGNOSIS — M25.60 STIFFNESS IN JOINT: ICD-10-CM

## 2022-10-05 DIAGNOSIS — E66.9 OBESITY (BMI 30.0-34.9): ICD-10-CM

## 2022-10-05 DIAGNOSIS — G89.29 CHRONIC BILATERAL LOW BACK PAIN WITHOUT SCIATICA: ICD-10-CM

## 2022-10-05 DIAGNOSIS — M54.50 CHRONIC BILATERAL LOW BACK PAIN WITHOUT SCIATICA: ICD-10-CM

## 2022-10-05 PROCEDURE — 99214 OFFICE O/P EST MOD 30 MIN: CPT | Performed by: STUDENT IN AN ORGANIZED HEALTH CARE EDUCATION/TRAINING PROGRAM

## 2022-10-05 RX ORDER — VARENICLINE TARTRATE 1 MG/1
1 TABLET, FILM COATED ORAL 2 TIMES DAILY
Qty: 56 TABLET | Refills: 1 | Status: SHIPPED | OUTPATIENT
Start: 2022-11-02 | End: 2022-10-26

## 2022-10-05 NOTE — PROGRESS NOTES
"Chief Complaint  Follow-up (6 month follow up ), arthritis, lower back pain    Subjective         Francisca Fields is a 61 y.o. female who presents to [facility]    61 years old female comes to the clinic today to follow-up.    Obesity; working hard with diet and exercise to lose weight.    Arthritis/joint stiffness; chronic, would like to try something to help with arthritis.  Has tried Motrin at home with somewhat good improvement.    Chronic back pain; have seen specialist but does not want any interventions.    Current smoker, would like to try Chantix.    Declined low-dose CT/mammogram.    Denies any chest pain or shortness of breath on exertion.    Objective   Vital Signs:   Vitals:    10/05/22 0823   BP: 132/76   BP Location: Left arm   Patient Position: Sitting   Cuff Size: Adult   Pulse: 61   Temp: 96.2 °F (35.7 °C)   TempSrc: Temporal   SpO2: 98%   Weight: 79.4 kg (175 lb)   Height: 157.5 cm (62\")      Body mass index is 32.01 kg/m².   Wt Readings from Last 3 Encounters:   10/05/22 79.4 kg (175 lb)   05/17/22 81.1 kg (178 lb 12.8 oz)   04/05/22 78 kg (172 lb)      BP Readings from Last 3 Encounters:   10/05/22 132/76   05/17/22 122/62   04/05/22 120/72        Patient Care Team:  Arian Ching MD as PCP - General (Family Medicine)     Physical Exam  Vitals reviewed.   Constitutional:       Appearance: Normal appearance. She is well-developed.   HENT:      Head: Normocephalic and atraumatic.      Right Ear: External ear normal.      Left Ear: External ear normal.      Mouth/Throat:      Pharynx: No oropharyngeal exudate.   Eyes:      Conjunctiva/sclera: Conjunctivae normal.      Pupils: Pupils are equal, round, and reactive to light.   Cardiovascular:      Rate and Rhythm: Normal rate and regular rhythm.      Heart sounds: No murmur heard.    No friction rub. No gallop.   Pulmonary:      Effort: Pulmonary effort is normal.      Breath sounds: Normal breath sounds. No " wheezing or rhonchi.   Abdominal:      General: Bowel sounds are normal. There is no distension.      Palpations: Abdomen is soft.      Tenderness: There is no abdominal tenderness.   Skin:     General: Skin is warm and dry.   Neurological:      Mental Status: She is alert and oriented to person, place, and time.      Cranial Nerves: No cranial nerve deficit.   Psychiatric:         Mood and Affect: Mood and affect normal.         Behavior: Behavior normal.         Thought Content: Thought content normal.         Judgment: Judgment normal.                       Assessment and Plan   Diagnoses and all orders for this visit:    1. Arthritis (Primary)  -     diclofenac (VOLTAREN) 50 MG EC tablet; Take 1 tablet by mouth 2 (Two) Times a Day As Needed (pain).  Dispense: 60 tablet; Refill: 1    2. Stiffness in joint  -     diclofenac (VOLTAREN) 50 MG EC tablet; Take 1 tablet by mouth 2 (Two) Times a Day As Needed (pain).  Dispense: 60 tablet; Refill: 1    3. Smoking  -     varenicline (CHANTIX TRIP) 0.5 MG X 11 & 1 MG X 42 tablet; Take 0.5 mg po daily x 3 days, then 0.5 mg po bid x 4 days, then 1 mg po bid  Dispense: 53 tablet; Refill: 0  -     varenicline (Chantix Continuing Month Trip) 1 MG tablet; Take 1 tablet by mouth 2 (Two) Times a Day for 56 days.  Dispense: 56 tablet; Refill: 1    4. Chronic bilateral low back pain without sciatica    5. Obesity (BMI 30.0-34.9)    Smoking cessation discussed, will start patient on Chantix.    We will start patient on diclofenac, we will order autoimmune and rheumatoid work-up with next blood work.    Pain management offered but declined at this time.    Declined mammogram and low-dose CT      Tobacco Use: High Risk   • Smoking Tobacco Use: Current Some Day Smoker   • Smokeless Tobacco Use: Never Used            Follow Up   Return in about 3 months (around 1/5/2023) for Next scheduled follow up.  Patient was given instructions and counseling regarding her condition or for health  maintenance advice. Please see specific information pulled into the AVS if appropriate.

## 2022-10-25 ENCOUNTER — TELEPHONE (OUTPATIENT)
Dept: FAMILY MEDICINE CLINIC | Facility: CLINIC | Age: 62
End: 2022-10-25

## 2022-10-25 NOTE — TELEPHONE ENCOUNTER
Caller: Francisca Fields    Relationship: Self    Best call back number: [phone]    What medication are you requesting: SOMETHING FOR A UT    What are your current symptoms: BURNING, FREQUENCY TO GO     How long have you been experiencing symptoms: FOR A COUPLE OF DAYS NOW.     Have you had these symptoms before:    [x] Yes  [] No    Have you been treated for these symptoms before:   [x] Yes  [] No    If a prescription is needed, what is your preferred pharmacy and phone number:  [id number] - [place], [place] - [address] AT [facility] [phone] [facility] [phone]   [phone]

## 2022-10-26 ENCOUNTER — OFFICE VISIT (OUTPATIENT)
Dept: FAMILY MEDICINE CLINIC | Facility: CLINIC | Age: 62
End: 2022-10-26

## 2022-10-26 ENCOUNTER — TELEPHONE (OUTPATIENT)
Dept: FAMILY MEDICINE CLINIC | Facility: CLINIC | Age: 62
End: 2022-10-26

## 2022-10-26 VITALS
BODY MASS INDEX: 32.2 KG/M2 | TEMPERATURE: 97.8 F | HEIGHT: 62 IN | SYSTOLIC BLOOD PRESSURE: 130 MMHG | OXYGEN SATURATION: 99 % | RESPIRATION RATE: 19 BRPM | WEIGHT: 175 LBS | DIASTOLIC BLOOD PRESSURE: 74 MMHG | HEART RATE: 85 BPM

## 2022-10-26 DIAGNOSIS — G89.29 CHRONIC BILATERAL LOW BACK PAIN WITHOUT SCIATICA: Primary | ICD-10-CM

## 2022-10-26 DIAGNOSIS — M54.2 CHRONIC NECK PAIN: ICD-10-CM

## 2022-10-26 DIAGNOSIS — M54.50 CHRONIC BILATERAL LOW BACK PAIN WITHOUT SCIATICA: Primary | ICD-10-CM

## 2022-10-26 DIAGNOSIS — R68.89 FORGETFULNESS: ICD-10-CM

## 2022-10-26 DIAGNOSIS — F41.9 ANXIETY: ICD-10-CM

## 2022-10-26 DIAGNOSIS — Z79.899 MEDICATION MANAGEMENT: ICD-10-CM

## 2022-10-26 DIAGNOSIS — R31.29 OTHER MICROSCOPIC HEMATURIA: ICD-10-CM

## 2022-10-26 DIAGNOSIS — G89.29 CHRONIC NECK PAIN: ICD-10-CM

## 2022-10-26 DIAGNOSIS — R39.9 LOWER URINARY TRACT SYMPTOMS (LUTS): ICD-10-CM

## 2022-10-26 LAB
AMPHET+METHAMPHET UR QL: NEGATIVE
AMPHETAMINE INTERNAL CONTROL: NORMAL
AMPHETAMINES UR QL: NEGATIVE
BARBITURATE INTERNAL CONTROL: NORMAL
BARBITURATES UR QL SCN: NEGATIVE
BENZODIAZ UR QL SCN: NEGATIVE
BENZODIAZEPINE INTERNAL CONTROL: NORMAL
BILIRUB BLD-MCNC: NEGATIVE MG/DL
BUPRENORPHINE INTERNAL CONTROL: NORMAL
BUPRENORPHINE SERPL-MCNC: NEGATIVE NG/ML
CANNABINOIDS SERPL QL: NEGATIVE
CLARITY, POC: CLEAR
COCAINE INTERNAL CONTROL: NORMAL
COCAINE UR QL: NEGATIVE
COLOR UR: YELLOW
EXPIRATION DATE: ABNORMAL
EXPIRATION DATE: NORMAL
GLUCOSE UR STRIP-MCNC: NEGATIVE MG/DL
KETONES UR QL: NEGATIVE
LEUKOCYTE EST, POC: NEGATIVE
Lab: ABNORMAL
Lab: NORMAL
MDMA (ECSTASY) INTERNAL CONTROL: NORMAL
MDMA UR QL SCN: NEGATIVE
METHADONE INTERNAL CONTROL: NORMAL
METHADONE UR QL SCN: NEGATIVE
METHAMPHETAMINE INTERNAL CONTROL: NORMAL
NITRITE UR-MCNC: NEGATIVE MG/ML
OPIATES INTERNAL CONTROL: NORMAL
OPIATES UR QL: NEGATIVE
OXYCODONE INTERNAL CONTROL: NORMAL
OXYCODONE UR QL SCN: NEGATIVE
PCP UR QL SCN: NEGATIVE
PH UR: 7 [PH] (ref 5–8)
PHENCYCLIDINE INTERNAL CONTROL: NORMAL
PROT UR STRIP-MCNC: NEGATIVE MG/DL
RBC # UR STRIP: ABNORMAL /UL
SP GR UR: 1.02 (ref 1–1.03)
THC INTERNAL CONTROL: NORMAL
UROBILINOGEN UR QL: NORMAL

## 2022-10-26 PROCEDURE — 99214 OFFICE O/P EST MOD 30 MIN: CPT | Performed by: STUDENT IN AN ORGANIZED HEALTH CARE EDUCATION/TRAINING PROGRAM

## 2022-10-26 RX ORDER — VARENICLINE TARTRATE 25 MG
KIT ORAL
COMMUNITY
Start: 2022-10-20

## 2022-10-26 RX ORDER — TRAMADOL HYDROCHLORIDE 50 MG/1
50 TABLET ORAL EVERY 12 HOURS PRN
Qty: 60 TABLET | Refills: 1 | Status: SHIPPED | OUTPATIENT
Start: 2022-10-26 | End: 2023-01-23 | Stop reason: SDUPTHER

## 2022-10-26 RX ORDER — AMITRIPTYLINE HYDROCHLORIDE 25 MG/1
25 TABLET, FILM COATED ORAL NIGHTLY
Qty: 30 TABLET | Refills: 1 | Status: SHIPPED | OUTPATIENT
Start: 2022-10-26 | End: 2023-01-24

## 2022-10-26 NOTE — PROGRESS NOTES
"Chief Complaint  Patient is here for multiple concerns, possible UTI/worsening chronic lower back and neck pain, talk about anxiety and forgetfulness    Subjective         Francisca Fields is a 61 y.o. female who presents to [facility]    61 years old female comes to the clinic today to follow-up and multiple concerns.    Reporting possible UTI, increased frequency without any blood in urine or any dysuria.    Reports worsening of chronic lower back and neck pain.  Have tried tramadol before which worked great and would like to try it again.  Does not want to see any pain management due to time-consuming and co-pay.    Patient does report mild to moderate anxiety which is not well controlled specially after son's death 6 months ago, denies any SI/HI but would like to try something to help with anxiety.    Patient does report that occasionally she has been forgetting to turn off her stove/word finding difficulties and family would like her to follow-up with neurologist for further evaluation.    Denies any other acute complaints or any chest pain or shortness of breath.    Objective   Vital Signs:   Vitals:    10/26/22 1323   BP: 130/74   Pulse: 85   Resp: 19   Temp: 97.8 °F (36.6 °C)   SpO2: 99%   Weight: 79.4 kg (175 lb)   Height: 157.5 cm (62\")      Body mass index is 32.01 kg/m².   Wt Readings from Last 3 Encounters:   10/26/22 79.4 kg (175 lb)   10/05/22 79.4 kg (175 lb)   05/17/22 81.1 kg (178 lb 12.8 oz)      BP Readings from Last 3 Encounters:   10/26/22 130/74   10/05/22 132/76   05/17/22 122/62        Patient Care Team:  Arian Ching MD as PCP - General (Family Medicine)     Physical Exam  Vitals reviewed.   Constitutional:       Appearance: Normal appearance. She is well-developed.   HENT:      Head: Normocephalic and atraumatic.      Right Ear: External ear normal.      Left Ear: External ear normal.      Mouth/Throat:      Pharynx: No oropharyngeal exudate.   Eyes:     "  Conjunctiva/sclera: Conjunctivae normal.      Pupils: Pupils are equal, round, and reactive to light.   Cardiovascular:      Rate and Rhythm: Normal rate and regular rhythm.      Heart sounds: No murmur heard.    No friction rub. No gallop.   Pulmonary:      Effort: Pulmonary effort is normal.      Breath sounds: Normal breath sounds. No wheezing or rhonchi.   Abdominal:      General: Bowel sounds are normal. There is no distension.      Palpations: Abdomen is soft.      Tenderness: There is no abdominal tenderness.   Skin:     General: Skin is warm and dry.   Neurological:      Mental Status: She is alert and oriented to person, place, and time.      Cranial Nerves: No cranial nerve deficit.   Psychiatric:         Mood and Affect: Mood and affect normal.         Behavior: Behavior normal.         Thought Content: Thought content normal.         Judgment: Judgment normal.                       Assessment and Plan   Diagnoses and all orders for this visit:    1. Chronic bilateral low back pain without sciatica (Primary)  Comments:  Pain management offered, declined.  We will try tramadol for short-term/explained that I do not do any chronic pain management   Orders:  -     Ambulatory Referral to Chiropractic  -     XR Spine Lumbar 2 or 3 View; Future  -     POC Urine Drug Screen Premier Bio-Cup  -     traMADol (ULTRAM) 50 MG tablet; Take 1 tablet by mouth Every 12 (Twelve) Hours As Needed for Moderate Pain.  Dispense: 60 tablet; Refill: 1    2. Lower urinary tract symptoms (LUTS)  Comments:  Urine analysis is normal except small blood, will repeat UA in 1 week.  Possible urology if microscopic hematuria  Orders:  -     POCT urinalysis dipstick, automated    3. Other microscopic hematuria  Comments:  Repeat urinalysis in 1 week, urology if needed  Orders:  -     Urinalysis With Microscopic - Urine, Clean Catch; Future    4. Chronic neck pain  Comments:  Try tramadol as requested, UDS is normal/[place]  reviewed/contract signed  Orders:  -     Ambulatory Referral to Chiropractic  -     XR Spine Cervical 2 or 3 View; Future  -     POC Urine Drug Screen Premier Bio-Cup  -     traMADol (ULTRAM) 50 MG tablet; Take 1 tablet by mouth Every 12 (Twelve) Hours As Needed for Moderate Pain.  Dispense: 60 tablet; Refill: 1    5. Medication management  -     POC Urine Drug Screen Premier Bio-Cup  -     traMADol (ULTRAM) 50 MG tablet; Take 1 tablet by mouth Every 12 (Twelve) Hours As Needed for Moderate Pain.  Dispense: 60 tablet; Refill: 1    6. Anxiety  Comments:  We will try amitriptyline to help with sleep/anxiety and neuropathic pain.  Orders:  -     amitriptyline (ELAVIL) 25 MG tablet; Take 1 tablet by mouth Every Night.  Dispense: 30 tablet; Refill: 1    7. Forgetfulness  Comments:  Further evaluation by neurologist needed  Orders:  -     Ambulatory Referral to Neurology      I have explained patient risk associated with controlled substance use, drug drug interactions also discussed with tramadol/amitriptyline.  Patient understands and accepts risks.  Tobacco Use: High Risk   • Smoking Tobacco Use: Some Days   • Smokeless Tobacco Use: Never   • Passive Exposure: Not on file            Follow Up   Return if symptoms worsen or fail to improve.  Patient was given instructions and counseling regarding her condition or for health maintenance advice. Please see specific information pulled into the AVS if appropriate.

## 2022-10-26 NOTE — TELEPHONE ENCOUNTER
Caller: Francisca Fields    Relationship: Self    Best call back number: [phone]    What is the best time to reach you: ANY    Who are you requesting to speak with (clinical staff, provider,  specific staff member): CLINICAL STAFF    What was the call regarding: PATIENT CALLED STATING THAT HER TRAMADOL NEEDS A PRIOR AUTHORIZATION.    Do you require a callback: NO

## 2022-11-01 ENCOUNTER — LAB (OUTPATIENT)
Dept: LAB | Facility: HOSPITAL | Age: 62
End: 2022-11-01

## 2022-11-01 DIAGNOSIS — R31.29 OTHER MICROSCOPIC HEMATURIA: ICD-10-CM

## 2022-11-01 LAB
BACTERIA UR QL AUTO: ABNORMAL /HPF
BILIRUB UR QL STRIP: NEGATIVE
CLARITY UR: CLEAR
COLOR UR: YELLOW
GLUCOSE UR STRIP-MCNC: NEGATIVE MG/DL
HGB UR QL STRIP.AUTO: NEGATIVE
HYALINE CASTS UR QL AUTO: ABNORMAL /LPF
KETONES UR QL STRIP: NEGATIVE
LEUKOCYTE ESTERASE UR QL STRIP.AUTO: ABNORMAL
NITRITE UR QL STRIP: NEGATIVE
PH UR STRIP.AUTO: 5.5 [PH] (ref 5–8)
PROT UR QL STRIP: NEGATIVE
RBC # UR STRIP: ABNORMAL /HPF
REF LAB TEST METHOD: ABNORMAL
SP GR UR STRIP: 1.02 (ref 1–1.03)
SQUAMOUS #/AREA URNS HPF: ABNORMAL /HPF
UROBILINOGEN UR QL STRIP: ABNORMAL
WBC # UR STRIP: ABNORMAL /HPF

## 2022-11-01 PROCEDURE — 81001 URINALYSIS AUTO W/SCOPE: CPT

## 2022-11-09 ENCOUNTER — TELEPHONE (OUTPATIENT)
Dept: FAMILY MEDICINE CLINIC | Facility: CLINIC | Age: 62
End: 2022-11-09

## 2022-11-09 RX ORDER — CEFDINIR 300 MG/1
300 CAPSULE ORAL 2 TIMES DAILY
Qty: 20 CAPSULE | Refills: 0 | Status: SHIPPED | OUTPATIENT
Start: 2022-11-09 | End: 2023-01-24

## 2022-11-09 NOTE — TELEPHONE ENCOUNTER
Informed patient of results from 11/1/22. Patient states that she is having a burning sensation on the inside of her stomach and would like for the antibiotics to be called into pharmacy.

## 2022-11-10 ENCOUNTER — TELEPHONE (OUTPATIENT)
Dept: FAMILY MEDICINE CLINIC | Facility: CLINIC | Age: 62
End: 2022-11-10

## 2022-11-10 NOTE — TELEPHONE ENCOUNTER
Caller: Francisca Fields    Relationship: Self    Best call back number: [phone]    What is the medical concern/diagnosis: UTERINE PAIN    What specialty or service is being requested: GYNECOLOGIST IN [place]    What is the provider, practice or medical service name: UNKNOWN    What is the office location: UNKNOWN    What is the office phone number: UNKNOWN    Any additional details: PATIENT CALLED AND STATED THAT DR. VO WANTED HER TO HAVE A REFERRAL TO A GYNECOLOGIST. PATIENT STATED THAT SHE IS HAVING UTERINE PAIN.

## 2022-11-16 ENCOUNTER — TELEPHONE (OUTPATIENT)
Dept: FAMILY MEDICINE CLINIC | Facility: CLINIC | Age: 62
End: 2022-11-16

## 2022-11-16 NOTE — TELEPHONE ENCOUNTER
Patient cancelled their appointment with  for 11/17. Reached out to patient to see if they would like to reschedule. Patient states she will call back to reschedule.

## 2022-12-15 ENCOUNTER — TELEPHONE (OUTPATIENT)
Dept: FAMILY MEDICINE CLINIC | Facility: CLINIC | Age: 62
End: 2022-12-15

## 2022-12-15 DIAGNOSIS — R42 VERTIGO: Primary | ICD-10-CM

## 2022-12-15 RX ORDER — MECLIZINE HYDROCHLORIDE 25 MG/1
25 TABLET ORAL 3 TIMES DAILY PRN
Qty: 30 TABLET | Refills: 0 | Status: SHIPPED | OUTPATIENT
Start: 2022-12-15 | End: 2023-02-21 | Stop reason: SDUPTHER

## 2022-12-15 NOTE — TELEPHONE ENCOUNTER
Caller: rFancisca Fields    Relationship: Self    Best call back number: [phone]    What medication are you requesting: VERTIGO MEDICATION     What are your current symptoms: VERTIGO/DIZZINESS    How long have you been experiencing symptoms: TWO WEEKS    Have you had these symptoms before:    [] Yes  [x] No    Have you been treated for these symptoms before:   [] Yes  [x] No    If a prescription is needed, what is your preferred pharmacy and phone number: [id number] - [place], [place] - [address] AT [facility] [phone] [facility] [phone]

## 2022-12-30 ENCOUNTER — TELEPHONE (OUTPATIENT)
Dept: FAMILY MEDICINE CLINIC | Facility: CLINIC | Age: 62
End: 2022-12-30

## 2022-12-30 NOTE — TELEPHONE ENCOUNTER
LVMTCB     Patient cancelled their appointment scheduled for 1/5/23 with Arian Ching.      Would patient like to reschedule?       HUB TO READ AND SHARE

## 2023-01-23 DIAGNOSIS — Z79.899 MEDICATION MANAGEMENT: ICD-10-CM

## 2023-01-23 DIAGNOSIS — M54.2 CHRONIC NECK PAIN: ICD-10-CM

## 2023-01-23 DIAGNOSIS — G89.29 CHRONIC BILATERAL LOW BACK PAIN WITHOUT SCIATICA: ICD-10-CM

## 2023-01-23 DIAGNOSIS — M54.50 CHRONIC BILATERAL LOW BACK PAIN WITHOUT SCIATICA: ICD-10-CM

## 2023-01-23 DIAGNOSIS — G89.29 CHRONIC NECK PAIN: ICD-10-CM

## 2023-01-23 RX ORDER — TRAMADOL HYDROCHLORIDE 50 MG/1
50 TABLET ORAL EVERY 12 HOURS PRN
Qty: 60 TABLET | Refills: 1 | Status: SHIPPED | OUTPATIENT
Start: 2023-01-23

## 2023-01-24 ENCOUNTER — OFFICE VISIT (OUTPATIENT)
Dept: FAMILY MEDICINE CLINIC | Facility: CLINIC | Age: 63
End: 2023-01-24
Payer: COMMERCIAL

## 2023-01-24 VITALS
HEIGHT: 62 IN | TEMPERATURE: 97.1 F | SYSTOLIC BLOOD PRESSURE: 130 MMHG | DIASTOLIC BLOOD PRESSURE: 92 MMHG | OXYGEN SATURATION: 98 % | HEART RATE: 72 BPM | WEIGHT: 182 LBS | BODY MASS INDEX: 33.49 KG/M2

## 2023-01-24 DIAGNOSIS — E66.9 OBESITY (BMI 30.0-34.9): Primary | ICD-10-CM

## 2023-01-24 DIAGNOSIS — M51.36 DEGENERATIVE DISC DISEASE, LUMBAR: ICD-10-CM

## 2023-01-24 PROCEDURE — 80305 DRUG TEST PRSMV DIR OPT OBS: CPT | Performed by: STUDENT IN AN ORGANIZED HEALTH CARE EDUCATION/TRAINING PROGRAM

## 2023-01-24 PROCEDURE — 99214 OFFICE O/P EST MOD 30 MIN: CPT | Performed by: STUDENT IN AN ORGANIZED HEALTH CARE EDUCATION/TRAINING PROGRAM

## 2023-01-24 RX ORDER — PHENTERMINE HYDROCHLORIDE 30 MG/1
30 CAPSULE ORAL EVERY MORNING
Qty: 60 CAPSULE | Refills: 0 | Status: SHIPPED | OUTPATIENT
Start: 2023-01-24

## 2023-01-24 NOTE — PROGRESS NOTES
"Chief Complaint  Following up on medication/obesity and lower back pain    Subjective         Francisca Fields is a 62 y.o. female who presents to [facility]    62 years old comes to the clinic today to follow-up.    Reports no acute concerns other than obesity, patient would like to restart phentermine.    Chronic lower back pain, usually gets worse when she gains weight, tramadol has been helping.    Reports no other acute complaints  Objective   Vital Signs:   Vitals:    01/24/23 1031   BP: 130/92   BP Location: Left arm   Patient Position: Sitting   Cuff Size: Adult   Pulse: 72   Temp: 97.1 °F (36.2 °C)   TempSrc: Temporal   SpO2: 98%   Weight: 82.6 kg (182 lb)   Height: 157.5 cm (62.01\")      Body mass index is 33.28 kg/m².   Wt Readings from Last 3 Encounters:   01/24/23 82.6 kg (182 lb)   10/26/22 79.4 kg (175 lb)   10/05/22 79.4 kg (175 lb)      BP Readings from Last 3 Encounters:   01/24/23 130/92   10/26/22 130/74   10/05/22 132/76        Patient Care Team:  Arian Ching MD as PCP - General (Family Medicine)     Physical Exam  Vitals reviewed.   Constitutional:       Appearance: Normal appearance. She is well-developed.   HENT:      Head: Normocephalic and atraumatic.      Right Ear: External ear normal.      Left Ear: External ear normal.      Mouth/Throat:      Pharynx: No oropharyngeal exudate.   Eyes:      Conjunctiva/sclera: Conjunctivae normal.      Pupils: Pupils are equal, round, and reactive to light.   Cardiovascular:      Rate and Rhythm: Normal rate and regular rhythm.      Heart sounds: No murmur heard.    No friction rub. No gallop.   Pulmonary:      Effort: Pulmonary effort is normal.      Breath sounds: Normal breath sounds. No wheezing or rhonchi.   Abdominal:      General: Bowel sounds are normal. There is no distension.      Palpations: Abdomen is soft.      Tenderness: There is no abdominal tenderness.   Skin:     General: Skin is warm and dry. "   Neurological:      Mental Status: She is alert and oriented to person, place, and time.      Cranial Nerves: No cranial nerve deficit.   Psychiatric:         Mood and Affect: Mood and affect normal.         Behavior: Behavior normal.         Thought Content: Thought content normal.         Judgment: Judgment normal.                       Assessment and Plan   Diagnoses and all orders for this visit:    1. Obesity (BMI 30.0-34.9) (Primary)  Comments:  We will try phentermine/UDS/contract reviewed.  Severe side effect of phentermine discussed  Orders:  -     POC Urine Drug Screen [facility]-C[facility]  -     phentermine 30 MG capsule; Take 1 capsule by mouth Every Morning.  Dispense: 60 capsule; Refill: 0    2. Degenerative disc disease, lumbar  Comments:  Continue with tramadol  Orders:  -     POC Urine Drug Screen [facility]      I have discussed the risks associated with using phentermine specially around her age; patient accepts all the risks and would like to try it and take it due to possible gain.    UDS is normal/contract signed.    Patient is going to be now on 2 controlled substance tramadol and phentermine.  We will use phentermine for only 3 to 4 months.    Tobacco Use: Medium Risk   • Smoking Tobacco Use: Former   • Smokeless Tobacco Use: Never   • Passive Exposure: Not on file            Follow Up   Return in about 2 months (around 3/24/2023) for Next scheduled follow up.  Patient was given instructions and counseling regarding her condition or for health maintenance advice. Please see specific information pulled into the AVS if appropriate.

## 2023-02-21 DIAGNOSIS — R42 VERTIGO: ICD-10-CM

## 2023-02-21 RX ORDER — MECLIZINE HYDROCHLORIDE 25 MG/1
25 TABLET ORAL 3 TIMES DAILY PRN
Qty: 30 TABLET | Refills: 0 | Status: SHIPPED | OUTPATIENT
Start: 2023-02-21

## 2023-02-21 NOTE — TELEPHONE ENCOUNTER
Caller: Francisca Fields    Relationship: Self    Best call back number: [phone]    Requested Prescriptions:   Requested Prescriptions     Pending Prescriptions Disp Refills   • meclizine (ANTIVERT) 25 MG tablet 30 tablet 0     Sig: Take 1 tablet by mouth 3 (Three) Times a Day As Needed for Dizziness.        Pharmacy where request should be sent: [facility] #[id number] - [facility] [address] [facility] AT [address] [place] [address] [phone] [facility] [phone] FX     Does the patient have less than a 3 day supply:  [x] Yes  [] No    Would you like a call back once the refill request has been completed: [x] Yes [] No    If the office needs to give you a call back, can they leave a voicemail: [x] Yes [] No    Terrance Gonzalez Rep   02/21/23 13:37 EST

## 2023-03-02 ENCOUNTER — OFFICE VISIT (OUTPATIENT)
Dept: FAMILY MEDICINE CLINIC | Facility: CLINIC | Age: 63
End: 2023-03-02
Payer: COMMERCIAL

## 2023-03-02 VITALS
TEMPERATURE: 98.2 F | BODY MASS INDEX: 33.69 KG/M2 | SYSTOLIC BLOOD PRESSURE: 118 MMHG | DIASTOLIC BLOOD PRESSURE: 74 MMHG | OXYGEN SATURATION: 97 % | WEIGHT: 183.1 LBS | HEART RATE: 98 BPM | HEIGHT: 62 IN

## 2023-03-02 DIAGNOSIS — J06.9 UPPER RESPIRATORY TRACT INFECTION, UNSPECIFIED TYPE: Primary | ICD-10-CM

## 2023-03-02 LAB
EXPIRATION DATE: NORMAL
FLUAV AG UPPER RESP QL IA.RAPID: NOT DETECTED
FLUBV AG UPPER RESP QL IA.RAPID: NOT DETECTED
INTERNAL CONTROL: NORMAL
Lab: NORMAL
SARS-COV-2 AG UPPER RESP QL IA.RAPID: NOT DETECTED

## 2023-03-02 PROCEDURE — 99213 OFFICE O/P EST LOW 20 MIN: CPT | Performed by: STUDENT IN AN ORGANIZED HEALTH CARE EDUCATION/TRAINING PROGRAM

## 2023-03-02 PROCEDURE — 87428 SARSCOV & INF VIR A&B AG IA: CPT | Performed by: STUDENT IN AN ORGANIZED HEALTH CARE EDUCATION/TRAINING PROGRAM

## 2023-03-02 RX ORDER — BROMPHENIRAMINE MALEATE, PSEUDOEPHEDRINE HYDROCHLORIDE, AND DEXTROMETHORPHAN HYDROBROMIDE 2; 30; 10 MG/5ML; MG/5ML; MG/5ML
5 SYRUP ORAL 4 TIMES DAILY PRN
Qty: 118 ML | Refills: 0 | Status: SHIPPED | OUTPATIENT
Start: 2023-03-02

## 2023-03-02 RX ORDER — AZITHROMYCIN 250 MG/1
TABLET, FILM COATED ORAL
Qty: 6 TABLET | Refills: 0 | Status: SHIPPED | OUTPATIENT
Start: 2023-03-02

## 2023-03-02 RX ORDER — PREDNISONE 20 MG/1
40 TABLET ORAL DAILY
Qty: 10 TABLET | Refills: 0 | Status: SHIPPED | OUTPATIENT
Start: 2023-03-02

## 2023-03-02 RX ORDER — FLUTICASONE PROPIONATE 50 MCG
2 SPRAY, SUSPENSION (ML) NASAL DAILY
Qty: 16 G | Refills: 0 | Status: SHIPPED | OUTPATIENT
Start: 2023-03-02

## 2023-03-02 NOTE — PROGRESS NOTES
"Chief Complaint  5 days of sinus congestion/URI symptoms    Subjective         Francisca Fields is a 62 y.o. female who presents to [facility]    62 years old comes to the clinic today for an acute visit.  Patient is complaining of 5 days of URI-like symptoms and sinus congestion, reports this started during her trip to [place].  Denies any fever/chest pain/shortness of breath but does report lots of sinus drainage and congestion.  Physically active, denies any GI symptoms.      Objective   Vital Signs:   Vitals:    03/02/23 1007   BP: 118/74   BP Location: Left arm   Patient Position: Sitting   Cuff Size: Large Adult   Pulse: 98   Temp: 98.2 °F (36.8 °C)   TempSrc: Temporal   SpO2: 97%   Weight: 83.1 kg (183 lb 1.6 oz)   Height: 157.5 cm (62.01\")      Body mass index is 33.48 kg/m².   Wt Readings from Last 3 Encounters:   03/02/23 83.1 kg (183 lb 1.6 oz)   01/24/23 82.6 kg (182 lb)   10/26/22 79.4 kg (175 lb)      BP Readings from Last 3 Encounters:   03/02/23 118/74   01/24/23 130/92   10/26/22 130/74        Patient Care Team:  Arian Ching MD as PCP - General (Family Medicine)     Physical Exam  HENT:      Right Ear: Tympanic membrane has decreased mobility.      Left Ear: Tympanic membrane has decreased mobility.      Mouth/Throat:      Pharynx: Pharyngeal swelling present.      Comments: Positive sinus drainage  Pulmonary:      Effort: Pulmonary effort is normal.      Breath sounds: Normal breath sounds.                        Assessment and Plan   Diagnoses and all orders for this visit:    1. Upper respiratory tract infection, unspecified type (Primary)  -     azithromycin (Zithromax Z-Art) 250 MG tablet; Take 2 tablets by mouth on day 1, then 1 tablet daily on days 2-5  Dispense: 6 tablet; Refill: 0  -     predniSONE (DELTASONE) 20 MG tablet; Take 2 tablets by mouth Daily.  Dispense: 10 tablet; Refill: 0  -     brompheniramine-pseudoephedrine-DM 30-2-10 MG/5ML syrup; Take " 5 mL by mouth 4 (Four) Times a Day As Needed for Congestion.  Dispense: 118 mL; Refill: 0  -     fluticasone (FLONASE) 50 MCG/ACT nasal spray; 2 sprays into the nostril(s) as directed by provider Daily.  Dispense: 16 g; Refill: 0  -     POCT SARS-CoV-2 Antigen ANA ROSA      Negative rapid flu/COVID   We will empirically treat, further interventions needed if not improved or any worsening within next 2 to 3 days including chest x-ray.    Tobacco Use: Medium Risk   • Smoking Tobacco Use: Former   • Smokeless Tobacco Use: Never   • Passive Exposure: Not on file            Follow Up   No follow-ups on file.  Patient was given instructions and counseling regarding her condition or for health maintenance advice. Please see specific information pulled into the AVS if appropriate.

## 2023-03-29 ENCOUNTER — TELEPHONE (OUTPATIENT)
Dept: FAMILY MEDICINE CLINIC | Facility: CLINIC | Age: 63
End: 2023-03-29
Payer: COMMERCIAL

## 2023-03-29 NOTE — TELEPHONE ENCOUNTER
LVMTCB    Patient missed their appointment scheduled on 3/24/23 with .     Would patient like to be rescheduled?    No show letter sent to patient either via [facility] or mail.     HUB TO SHARE

## 2023-05-10 ENCOUNTER — TELEPHONE (OUTPATIENT)
Dept: FAMILY MEDICINE CLINIC | Facility: CLINIC | Age: 63
End: 2023-05-10

## 2023-05-10 NOTE — TELEPHONE ENCOUNTER
Caller: Francisca Fields    Relationship to patient: Self    Best call back number:    [phone]     Chief complaint: SHARP CHEST PAINS,SHORTNESS OF BREATH     Patient directed to call 911 or go to their nearest emergency room.     Patient verbalized understanding: [x] Yes  [] No  If no, why?

## 2023-05-10 NOTE — TELEPHONE ENCOUNTER
Patient stated that the chest pain has been going on for a couple days but is doing better now. Patient stated that she's really tired and she has swelling in her hands and feet. Patient was advised again that she needs to go to the ER for medical treatment. Patient stated that she would be leaving to go to the ER in 15 minutes.

## 2023-05-11 ENCOUNTER — TELEPHONE (OUTPATIENT)
Dept: FAMILY MEDICINE CLINIC | Facility: CLINIC | Age: 63
End: 2023-05-11

## 2023-05-11 DIAGNOSIS — R07.9 CHEST PAIN, UNSPECIFIED TYPE: Primary | ICD-10-CM

## 2023-05-11 RX ORDER — ONDANSETRON 8 MG/1
8 TABLET, ORALLY DISINTEGRATING ORAL EVERY 8 HOURS PRN
Qty: 30 TABLET | Refills: 0 | Status: SHIPPED | OUTPATIENT
Start: 2023-05-11

## 2023-05-11 NOTE — TELEPHONE ENCOUNTER
Patient is aware that cardiology office will contact her with an appointment and will also  her zofran from the pharmacy.

## 2023-05-11 NOTE — TELEPHONE ENCOUNTER
Caller: Francisca Fields    Relationship: Self    Best call back number: [phone]     What is the medical concern/diagnosis: ENLARGE HEART     What specialty or service is being requested: CARDIOLOGIST     What is the provider, practice or medical service name: N/A     What is the office location: [facility]     What is the office phone number:N/A    Any additional details:     PATIENT WAS SEEN AT ER YESTERDAY AT [facility] AND THE DOCTOR TOLD PATIENT TO LET HER PCP REQUEST A REFERRAL FOR A CARDIOLOGIST. PATIENT WAS PRESCRIBED PAIN MEDICATION AT ER AND PATIENT HAS BEEN HAVING REALLY BAD NAUSEA AND WANTED TO KNOW IF SHE CAN GET ANY MEDICATION TO HELP WITH NAUSEA.     PATIENT WOULD LIKE A PROVIDER IN [facility] FOR CARDIOLOGIST

## 2023-05-22 ENCOUNTER — TELEPHONE (OUTPATIENT)
Dept: FAMILY MEDICINE CLINIC | Facility: CLINIC | Age: 63
End: 2023-05-22
Payer: COMMERCIAL

## 2023-05-23 DIAGNOSIS — R07.9 CHEST PAIN, UNSPECIFIED TYPE: ICD-10-CM

## 2023-05-23 DIAGNOSIS — F17.200 SMOKING: Primary | ICD-10-CM

## 2023-05-30 ENCOUNTER — HOSPITAL ENCOUNTER (OUTPATIENT)
Dept: GENERAL RADIOLOGY | Facility: HOSPITAL | Age: 63
Discharge: HOME | End: 2023-05-30
Admitting: STUDENT IN AN ORGANIZED HEALTH CARE EDUCATION/TRAINING PROGRAM

## 2023-05-30 DIAGNOSIS — F17.200 SMOKING: ICD-10-CM

## 2023-05-30 DIAGNOSIS — R07.9 CHEST PAIN, UNSPECIFIED TYPE: ICD-10-CM

## 2023-05-30 PROCEDURE — 71046 X-RAY EXAM CHEST 2 VIEWS: CPT

## 2023-06-02 ENCOUNTER — TELEPHONE (OUTPATIENT)
Dept: FAMILY MEDICINE CLINIC | Facility: CLINIC | Age: 63
End: 2023-06-02

## 2023-06-02 ENCOUNTER — OFFICE VISIT (OUTPATIENT)
Dept: FAMILY MEDICINE CLINIC | Facility: CLINIC | Age: 63
End: 2023-06-02

## 2023-06-02 VITALS
TEMPERATURE: 97.4 F | SYSTOLIC BLOOD PRESSURE: 150 MMHG | WEIGHT: 191.8 LBS | RESPIRATION RATE: 16 BRPM | DIASTOLIC BLOOD PRESSURE: 98 MMHG | HEART RATE: 105 BPM | HEIGHT: 62 IN | BODY MASS INDEX: 35.3 KG/M2 | OXYGEN SATURATION: 98 %

## 2023-06-02 DIAGNOSIS — E66.9 OBESITY (BMI 30-39.9): Primary | ICD-10-CM

## 2023-06-02 DIAGNOSIS — M54.9 UPPER BACK PAIN: ICD-10-CM

## 2023-06-02 DIAGNOSIS — M25.473 ANKLE SWELLING, UNSPECIFIED LATERALITY: ICD-10-CM

## 2023-06-02 DIAGNOSIS — Z87.891 PERSONAL HISTORY OF TOBACCO USE, PRESENTING HAZARDS TO HEALTH: ICD-10-CM

## 2023-06-02 PROCEDURE — 1159F MED LIST DOCD IN RCRD: CPT | Performed by: STUDENT IN AN ORGANIZED HEALTH CARE EDUCATION/TRAINING PROGRAM

## 2023-06-02 PROCEDURE — 1160F RVW MEDS BY RX/DR IN RCRD: CPT | Performed by: STUDENT IN AN ORGANIZED HEALTH CARE EDUCATION/TRAINING PROGRAM

## 2023-06-02 PROCEDURE — 99214 OFFICE O/P EST MOD 30 MIN: CPT | Performed by: STUDENT IN AN ORGANIZED HEALTH CARE EDUCATION/TRAINING PROGRAM

## 2023-06-02 RX ORDER — TRAMADOL HYDROCHLORIDE 50 MG/1
50 TABLET ORAL EVERY 12 HOURS PRN
Qty: 60 TABLET | Refills: 1 | Status: SHIPPED | OUTPATIENT
Start: 2023-06-02 | End: 2023-06-02 | Stop reason: SDUPTHER

## 2023-06-02 RX ORDER — HYDROCODONE BITARTRATE AND ACETAMINOPHEN 5; 325 MG/1; MG/1
1 TABLET ORAL EVERY 6 HOURS PRN
COMMUNITY
Start: 2023-05-10 | End: 2023-06-02

## 2023-06-02 RX ORDER — TRAMADOL HYDROCHLORIDE 50 MG/1
50 TABLET ORAL EVERY 12 HOURS PRN
Qty: 60 TABLET | Refills: 1 | Status: SHIPPED | OUTPATIENT
Start: 2023-06-02

## 2023-06-02 RX ORDER — FUROSEMIDE 20 MG/1
20 TABLET ORAL DAILY PRN
Qty: 90 TABLET | Refills: 1 | Status: SHIPPED | OUTPATIENT
Start: 2023-06-02

## 2023-06-02 NOTE — TELEPHONE ENCOUNTER
Per patient,  [facility] IN [facility] DOES NOT CARRY. PATIENT REQUESTING [facility] IN [place]      Can you send to [facility] in [place]?

## 2023-06-02 NOTE — TELEPHONE ENCOUNTER
Caller: Diamond Francisca    Relationship: Self    Best call back number: [phone]    Requested Prescriptions:   Requested Prescriptions     Pending Prescriptions Disp Refills   • traMADol (ULTRAM) 50 MG tablet 60 tablet 1     Sig: Take 1 tablet by mouth Every 12 (Twelve) Hours As Needed for Moderate Pain.        Pharmacy where request should be sent: [facility] #[id number] - [place], [place] - [address] [facility] AT [place] [address] [address] & [place] - [phone] [facility] [phone] FX     Last office visit with prescribing clinician: 6/2/2023   Last telemedicine visit with prescribing clinician: Visit date not found   Next office visit with prescribing clinician: Visit date not found     Additional details provided by patient: [facility] IN [facility] DOES NOT CARRY. PATIENT REQUESTING [facility] IN [place]    Does the patient have less than a 3 day supply:  [x] Yes  [] No    Would you like a call back once the refill request has been completed: [] Yes [] No    If the office needs to give you a call back, can they leave a voicemail: [] Yes [] No    Terrance Gonzalez Rep   06/02/23 14:00 EDT

## 2023-06-02 NOTE — TELEPHONE ENCOUNTER
Caller: Francisca Fields    Relationship: Self    Best call back number: [phone]    What is the best time to reach you: ANY    Who are you requesting to speak with (clinical staff, provider,  specific staff member): CLINICAL STAFF    What was the call regarding: PATIENT CALLED STATING THAT HER INSURANCE WOULD NOT COVER HER TRAMADOL OR WEIGHT LOSS MEDICATION. SHE WOULD LIKE TO KNOW IF SHE CAN GET A PRIOR AUTHORIZATION FOR THEM OR AT LEAST ANY WAY TO MAKE THE TRAMADOL LESS EXPENSIVE

## 2023-06-02 NOTE — PROGRESS NOTES
"Chief Complaint  Talk about obesity and upper back pain  Subjective         Francisca Fields is a 62 y.o. female who presents to [facility]  62 years old comes to the clinic today to talk about obesity and upper back pain.    Patient reports this her struggle with obesity, has tried multiple weight loss medications in the past including phentermine recently but no response.  Patient has been trying somewhat but no success, patient does report intermittent sedentary diet.  Patient is very adamant to try another weight loss medication.    Patient has chronic upper back pain, tramadol is helping.  Patient does not want to see any pain management or any work-up at this time, would like to continue tramadol for few more months so she can be more active and do some home physical therapy.    Denies any other acute complaint but does report occasional ankle swelling due to water retention and would like to try water pill.    Denies any chest pain or shortness of breath, physically somewhat active          Objective   Vital Signs:   Vitals:    06/02/23 0802   BP: 150/98   BP Location: Left arm   Patient Position: Sitting   Cuff Size: Large Adult   Pulse: 105   Resp: 16   Temp: 97.4 °F (36.3 °C)   TempSrc: Temporal   SpO2: 98%   Weight: 87 kg (191 lb 12.8 oz)   Height: 157.5 cm (62.01\")      Body mass index is 35.07 kg/m².   Wt Readings from Last 3 Encounters:   06/02/23 87 kg (191 lb 12.8 oz)   03/02/23 83.1 kg (183 lb 1.6 oz)   01/24/23 82.6 kg (182 lb)      BP Readings from Last 3 Encounters:   06/02/23 150/98   03/02/23 118/74   01/24/23 130/92        Patient Care Team:  Arian Ching MD as PCP - General (Family Medicine)     Physical Exam  Vitals reviewed.   Constitutional:       Appearance: Normal appearance. She is well-developed.   HENT:      Head: Normocephalic and atraumatic.      Right Ear: External ear normal.      Left Ear: External ear normal.      Mouth/Throat:      Pharynx: " No oropharyngeal exudate.   Eyes:      Conjunctiva/sclera: Conjunctivae normal.      Pupils: Pupils are equal, round, and reactive to light.   Cardiovascular:      Rate and Rhythm: Normal rate and regular rhythm.      Heart sounds: No murmur heard.    No friction rub. No gallop.   Pulmonary:      Effort: Pulmonary effort is normal.      Breath sounds: Normal breath sounds. No wheezing or rhonchi.   Abdominal:      General: Bowel sounds are normal. There is no distension.      Palpations: Abdomen is soft.      Tenderness: There is no abdominal tenderness.   Skin:     General: Skin is warm and dry.   Neurological:      Mental Status: She is alert and oriented to person, place, and time.      Cranial Nerves: No cranial nerve deficit.   Psychiatric:         Mood and Affect: Mood and affect normal.         Behavior: Behavior normal.         Thought Content: Thought content normal.         Judgment: Judgment normal.                 Assessment and Plan   Diagnoses and all orders for this visit:    1. Obesity (BMI 30-39.9) (Primary)  Comments:  Patient has tried phentermine in the past with no response.  Wants to try Contrave   Orders:  -     naltrexone-bupropion ER (CONTRAVE) 8-90 MG tablet; Wk 1: 1 tab daily, Wk 2: 1 tab twice a day, Wk 3: 2 tabs in AM, 1 tab in PM, Wk 4: 2 tabs twice a day, Maintenance dose: 2 tabs twice daily.  Dispense: 120 tablet; Refill: 0    2. Upper back pain  Comments:  Continue tramadol for now, we will reevaluate in 3 months for further interventions.  Goal is to not use tramadol for long-term  Orders:  -     traMADol (ULTRAM) 50 MG tablet; Take 1 tablet by mouth Every 12 (Twelve) Hours As Needed for Moderate Pain.  Dispense: 60 tablet; Refill: 1    3. Personal history of tobacco use, presenting hazards to health  -      CT Chest Low Dose Cancer Screening WO; Future    4. Ankle swelling, unspecified laterality  Comments:  Patient wants to try Lasix/water pill.  Orders:  -     furosemide (Lasix)  20 MG tablet; Take 1 tablet by mouth Daily As Needed (swelling).  Dispense: 90 tablet; Refill: 1    Medication side effects discussed in great detail, patient does want to take medication and accepts all the risk      Tobacco Use: Medium Risk   • Smoking Tobacco Use: Former   • Smokeless Tobacco Use: Never   • Passive Exposure: Not on file            Follow Up   Return in about 3 months (around 9/2/2023).  Patient was given instructions and counseling regarding her condition or for health maintenance advice. Please see specific information pulled into the AVS if appropriate.

## 2023-06-02 NOTE — TELEPHONE ENCOUNTER
Caller: Francisca Fields    Relationship: Self    Best call back number: [phone]    What was the call regarding: PATIENT WILL USE COUPON FOR TRAMADOL SO WILL NOT NEED PRIOR AUTHORIZATION.

## 2023-06-16 DIAGNOSIS — C25.9 MALIGNANT NEOPLASM OF PANCREAS, UNSPECIFIED LOCATION OF MALIGNANCY: Primary | ICD-10-CM

## 2023-06-16 RX ORDER — HYDROCODONE BITARTRATE AND ACETAMINOPHEN 5; 325 MG/1; MG/1
1 TABLET ORAL EVERY 6 HOURS PRN
Qty: 30 TABLET | Refills: 0 | OUTPATIENT
Start: 2023-06-16 | End: 2023-06-19

## 2023-06-19 NOTE — TELEPHONE ENCOUNTER
Caller: Francisca Fields    Relationship: Self    Best call back number: [phone]    What is the medical concern/diagnosis: PAIN IN LOWER RIGHT SHOULDER BLADE    What specialty or service is being requested: PULMONOLOGIST    What is the provider, practice or medical service name:     What is the office location:     What is the office phone number:     Any additional details: PATIENT SEEN HEART SPECIALIST IN [place] 5/22/23 DR. BISHOP AND THEY ARE THINKING IT IS NOT THE HEART, POSSIBLY THE LUNGS. CARDIOLOGIST HAS ORDERED ULTRA SOUND OF HEART    PLEASE CALL AND ADVISE WHEN REFERRAL HAS BEEN PLACED   Well Visit 12-18 Years    Randal Bullard is a 14 year old female who is accompanied by mom for their annual well child check.     Interim History:  Graduated 8th grade.  Needs form for high school.  May do swim team or diving, unsure, so needs sports form also.  Also going to [employer] for 2 weeks.  Has been going there for past 6-7 years.  Will attend [employer] next year.    Stopped Humatrope 2/23;   Endocrinology recommended annual thyroid studies and HbA1C with PMD; endocrinology followup as needed.    For vitiligo mom states she may make appt at [facility] dermatology, where patient has been seen in past, for possible new treatment.    Lives with:  Mom, dad, 15 yo brother  No smoking at home    Diet (per day) :  -Meals per day:3  - Milk/dairy: some yogurt, cheese, very rare milk  - Water:drinks through the day  - Juice/soda/caffeine: occ juice  -Fruits: 2  -Vegetables: 2  -Protein: chicken, meat, eggs  MVI daily    Dental: brushing twice daily, dental visits every 6 months  Vision: +glasses; no recent vision changes  Elimination:   - Problems with urination/stooling: no    Sleep:  - Hours of sleep per night: bed at 1-2 am and up at 10-11am in summer; is up on phone at night; does much better with earlier bedtime in school year  - Does your child snore at night on a regular basis: no  -Sleep issues:   Phone before bed in summer, has her staying up late    School/Behavior:  -School/grade: graduated 8th grade; [employer] in fall  -Favorite class/subject: All As and one B; likes social studies and science  -Any behavioral concerns: no    Activities:  Activities/Interests: marching band - clash cymbals, makes bracelets, sees some friends  Physical activity each day: PE, recess    Chores:  does own laundry!  ; helps with other stuff mom asks her to do  Screen Time: \"all day\" phone in summer; much less during school year  Reading/Books: mostly for school;     Review of Systems:  Any recent illness or injury:  no  Normal energy during day.  No vision changes, no headaches.  No constipation. No abdominal pain.  Denies SOB, CP, dizziness, palpitations at rest or with activity.    Has good friendships, safe at home and school, is getting along with family, no behavioral concerns at home or school.  No SA, no SI. No illicit drug use, no smoke/vaping.     Screening: PHQ 2/9 completed and scored in screenings tab.     LMP: 3 weeks ago; menses 5 days long, not too heavy; no skipped months in past year per patient    IHSA/IESA Pre participation Physical Form reviewed with parents and patient.    Sports Physical:  · Sports Physical Questions:  · Do you have any concerns that you would like to discuss with your provider? No  · Have you ever had a provider deny or restrict your participation in sports for any reason? No  · Do you have any ongoing medical issues or recent illness? No  · Have you ever had discomfort, pain, tightness, or pressure in your chest during exercise? No  · Have you ever passed out or nearly passed out during or after exercise? No  · Does your heart ever race, flutter in your chest or skip beats (irregular beats) during exercise? No  · Has a doctor ever told you that you have any heart problems? No  · Has a doctor ever requested a test for your heart, like an ECG or Echo? No  · Do you get light headed or feel shorter of breath than your friends during exercise? No  · Have you ever had a seizure? No  · Has any family member or relative  of heart problems or had an unexpected or unexplained sudden death before age 35 (including drowning or unexplained car crash)? No  · Does anyone in your family have a genetic heart problem such as hypertrophic cardiomyopathy (HCM), Marfan syndrome, arrhythmogenic right ventricular heart syndrome (ARVC), long QT syndrome (LQTS), short QT syndrome (SQTS), Brugada syndrome, or catecholaminergic polymorphic ventricular tachycardia (CPVT)? No  · Has anyone in your family had a  pacemaker or an implanted defibrillator before age 35? No   · Have you ever had a stress fracture or an injury to a bone, muscle, ligament, join, or tendon that caused you to miss a practice or game?  No  · Do you have a bone, muscle, ligament, or joint injury that bothers you? No  · Do you cough, wheeze, or have difficulty breathing during or after exercise? No  · Are you missing a kidney, an eye, a testicle (males), your spleen, or any other organ? No  · Do you have groin or testicle pain or a painful bulge or hernia in the groin area? No  · Do you have recurring skin rashes or rashes that come and go, including herpes or methicillin-resistant Staphylococcus aureus (MRSA)? No  · Have you had a concussion or head injury that caused confusion, a prolonged headache, or memory problems? No  · Have you ever had numbness, had tingling, had weakness in your arms or legs, or been unable to move your arms and legs after being hit or falling? No  · Have you ever become ill while exercising in the heat? No  · Do you or does someone in your family have sickle cell trait? No  · Have you ever had or do you have any problems with your eyes or vision? No  · Do you worry about your weight? No  · Are you trying to or has anyone recommended that you gain or or lose weight? No  · Are you on a special diet or do you avoid certain types of foods or food groups? No  · Have you ever had an eating disorder? 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  Her family history includes Diabetes in her father and maternal uncle; Heart in her maternal grandmother; Hypertension in her maternal grandmother and maternal uncle; Myocardial Infarction in her paternal grandfather; Sarcoidosis in her maternal grandfather.  Randal has a current medication list which includes the following prescription(s): humatrope, somatropin, insulin pen needle, somatropin, humatrope, loratadine, fluticasone, and tacrolimus.  Randal has No  Known Allergies.    Objective   Vitals:   There were no vitals filed for this visit.  No height and weight on file for this encounter.  No blood pressure reading on file for this encounter.  Growth parameters are noted and are appropriate for age.    Physical Exam:  Constitutional: WD female, active, well-appearing, NAD  Head: Normocephalic  Eyes: PERRLA; EOMI; sclerae clear b/l; +red reflex b/l; +glasses  Right Ear: TM clear and intact; EAC clear; no mastoid erythema or TTP  Left Ear: TM clear and intact; EAC clear; no mastoid erythema or TTP  Nose: Nares clear  Mouth/Throat: mmm, o/p clear, 2+ symmetric tonsils; +uvula midline  Neck: supple; no DAILY  Chest: Normal chest; Yasir stage IV  Cardiovascular: Normal rate, regular rhythm, S1 normal and S2 normal; no murmurs/rubs/gallops; 2+ bilateral radial and dp pulses; brisk CRT; HR 74  No murmur while seated or supine; no murmur with Valsalva  Pulmonary/Chest: b/l CTA, good aeration, no wheezing/crackles/flaring/retractions; RR 14  Abdominal: soft, NT, ND, no guarding or rebound, +BS; no hepatosplenomegaly, no masses, no RLQ TTP  Back: straight spine; no torso asymmetry noted; no scapula prominence/asymmetry noted; pelvis appears even; negative Freire forward bend test  Genitourinary: Normal external female genitalia; Yasir stage IV  Musculoskeletal: Normal range of motion; 5/5 MS in b/l UE and LE; normal gait  FROM of neck and upper and lower extremities without limitation or pain  5/5 MS in b/l UE and LE  Normal double-leg squat test, single-leg squat test, and step drop test; normal duck walk  No Marfan stigmata in appearance or on exam  Neurological: Normal tone; CN 2-12 intact; normal gait  Skin: warm and well-perfused; no rashes; no clubbing/cyanosis/edema; +depigmented patches on back and neck and trunk    13 yo female, with history of Luis F Silver syndrome and vitiligo, here for sports physical and WCC, doing well in growth, behavioral areas and routine  screening within normal limits. Well-appearing, well-hydrated, no meningismus, no increased WOB, tolerating po.  D/w mom and patient.      Problem List Items Addressed This Visit    None    Counseling  Weight management:  The patient was counseled regarding nutrition and physical activity    Immunizations: per orders.  The parent was counseled about each component of the vaccine, including possible side effects, risks, and benefits. VIS sheet was given.    Plan:  Try to limit screen/phone time for summer, no screens one hour before bed.  Will check screening labs today.  Anticipatory guidance and safety discussed.  Vaccines UTD and recommend Influenza in Fall  The parent was counseled about each component of the vaccine, including possible side effects, risks, and benefits.  A flu vaccination is recommended each fall.  Next vaccination to consider is Bexsero for Meningitis Type B for college aged young adults, 2 doses given 1 month apart.  Encourage reading and physical activity.  Limit screen time.  Discussed the importance of school and education.  Encourage good healthy eating choices.  Limit sugar sweetened beverages and soda.  Encourage increasing responsibilities, responsibility with driving, chores and gradual independence.  Be outdoors daily!   Wear helmets and use caution around water to prevent drowning.  Wear sunscreen when outside and reapply often.  Use insect repellant in warmer weather.  Safety and good choices discussed.  Follow-up yearly for a well visit, or sooner as needed.    All questions answered.  The parent/guardian verbalized understanding of all information.

## 2023-06-20 PROBLEM — Z71.6 TOBACCO ABUSE COUNSELING: Status: ACTIVE | Noted: 2020-10-13

## 2023-06-20 PROBLEM — M54.2 NECK PAIN: Status: ACTIVE | Noted: 2018-10-04

## 2023-06-20 PROBLEM — M54.50 LOW BACK PAIN: Status: ACTIVE | Noted: 2018-10-04

## 2023-06-20 PROBLEM — E78.5 HYPERLIPIDEMIA: Status: ACTIVE | Noted: 2020-01-16

## 2023-06-20 PROBLEM — R41.3 POOR SHORT-TERM MEMORY: Status: ACTIVE | Noted: 2019-05-31

## 2023-06-20 PROBLEM — M50.30 DDD (DEGENERATIVE DISC DISEASE), CERVICAL: Status: ACTIVE | Noted: 2022-03-28

## 2023-06-20 PROBLEM — R07.89 ATYPICAL CHEST PAIN: Status: ACTIVE | Noted: 2020-02-13

## 2023-06-20 PROBLEM — M54.16 LUMBAR RADICULOPATHY: Status: ACTIVE | Noted: 2021-05-04

## 2023-06-20 PROBLEM — R00.2 PALPITATIONS: Status: ACTIVE | Noted: 2023-05-22

## 2023-06-20 PROBLEM — M25.559 HIP PAIN: Status: ACTIVE | Noted: 2018-10-04

## 2023-06-20 PROBLEM — J06.9 ACUTE UPPER RESPIRATORY INFECTION: Status: ACTIVE | Noted: 2020-02-13

## 2023-06-20 PROBLEM — R13.10 DYSPHAGIA: Status: ACTIVE | Noted: 2018-10-17

## 2023-06-20 PROBLEM — R53.83 FATIGUE: Status: ACTIVE | Noted: 2020-01-16

## 2023-06-20 PROBLEM — M19.90 ARTHRITIS: Status: ACTIVE | Noted: 2023-06-20

## 2023-06-21 PROBLEM — G89.3 CANCER RELATED PAIN: Status: ACTIVE | Noted: 2023-06-21

## 2023-06-21 PROBLEM — C25.0 MALIGNANT NEOPLASM OF HEAD OF PANCREAS: Status: ACTIVE | Noted: 2023-06-21

## 2023-06-27 PROBLEM — R11.0 NAUSEA: Status: ACTIVE | Noted: 2023-06-27

## 2023-06-29 ENCOUNTER — TELEPHONE (OUTPATIENT)
Dept: ONCOLOGY | Facility: HOSPITAL | Age: 63
End: 2023-06-29

## 2023-06-29 NOTE — TELEPHONE ENCOUNTER
Caller: Francisca Fields    Relationship: Self    Best call back number: [phone]    What is the best time to reach you: ANYTIME    Who are you requesting to speak with (clinical staff, provider,  specific staff member): NON-CLINICAL    What was the call regarding: PT RETURNING A MISSED CALL    Is it okay if the provider responds through MyChart: YES

## 2023-07-03 PROBLEM — Z45.2 ENCOUNTER FOR ADJUSTMENT OR MANAGEMENT OF VASCULAR ACCESS DEVICE: Status: ACTIVE | Noted: 2023-07-03

## 2023-07-24 DIAGNOSIS — G89.3 CANCER RELATED PAIN: ICD-10-CM

## 2023-07-24 DIAGNOSIS — C25.0 MALIGNANT NEOPLASM OF HEAD OF PANCREAS: ICD-10-CM

## 2023-07-24 RX ORDER — CYCLOBENZAPRINE HCL 10 MG
10 TABLET ORAL 2 TIMES DAILY PRN
Qty: 60 TABLET | Refills: 1 | Status: SHIPPED | OUTPATIENT
Start: 2023-07-24

## 2023-07-24 RX ORDER — HYDROCODONE BITARTRATE AND ACETAMINOPHEN 7.5; 325 MG/1; MG/1
1 TABLET ORAL EVERY 6 HOURS PRN
Qty: 120 TABLET | Refills: 0 | Status: SHIPPED | OUTPATIENT
Start: 2023-07-24 | End: 2023-08-23

## 2023-07-24 NOTE — TELEPHONE ENCOUNTER
The pt called and reports that she had a rough weekend and that she has now lost all of her hair. The pt states that she has joint discomfort, muscle pain, and fatigue. This nurse informed the pt that those were common side effects from her tx. This nurse informed the pt that it is also common that her s/s started on the third day after tx. When questioned the pt states that she has been taking her Hydrocodone as prescribed. The pt states that she also took an old Cyclobenzaprine that was rx by her family doctor in the past when her muscles were hurting. The pt states that after she had been taking her Hydrocodone regular that she was still having muscle pain and she took the Cyclobenzaprine and it almost took all her muscle pain away.     The pt is requesting a refill for Hydrocodone and is asking if Dr liang would prescribe her a prn cyclobenzaprine.       The pt is also asking when her next scan will be. The pt is hoping to have sx in the next 30 days.

## 2023-07-25 ENCOUNTER — OFFICE VISIT (OUTPATIENT)
Dept: FAMILY MEDICINE CLINIC | Facility: CLINIC | Age: 63
End: 2023-07-25
Payer: COMMERCIAL

## 2023-07-25 VITALS
SYSTOLIC BLOOD PRESSURE: 122 MMHG | HEART RATE: 106 BPM | DIASTOLIC BLOOD PRESSURE: 76 MMHG | RESPIRATION RATE: 16 BRPM | BODY MASS INDEX: 33.46 KG/M2 | TEMPERATURE: 97.5 F | HEIGHT: 61 IN | WEIGHT: 177.2 LBS | OXYGEN SATURATION: 98 %

## 2023-07-25 DIAGNOSIS — R39.9 LOWER URINARY TRACT SYMPTOMS (LUTS): ICD-10-CM

## 2023-07-25 DIAGNOSIS — K86.89 PANCREATIC MASS: Primary | ICD-10-CM

## 2023-07-25 LAB
BILIRUB BLD-MCNC: ABNORMAL MG/DL
CLARITY, POC: CLEAR
COLOR UR: YELLOW
EXPIRATION DATE: ABNORMAL
GLUCOSE UR STRIP-MCNC: NEGATIVE MG/DL
KETONES UR QL: NEGATIVE
LEUKOCYTE EST, POC: ABNORMAL
Lab: ABNORMAL
NITRITE UR-MCNC: NEGATIVE MG/ML
PH UR: 5.5 [PH] (ref 5–8)
PROT UR STRIP-MCNC: ABNORMAL MG/DL
RBC # UR STRIP: ABNORMAL /UL
SP GR UR: 1.03 (ref 1–1.03)
UROBILINOGEN UR QL: ABNORMAL

## 2023-07-25 PROCEDURE — 81003 URINALYSIS AUTO W/O SCOPE: CPT | Performed by: STUDENT IN AN ORGANIZED HEALTH CARE EDUCATION/TRAINING PROGRAM

## 2023-07-25 PROCEDURE — 1160F RVW MEDS BY RX/DR IN RCRD: CPT | Performed by: STUDENT IN AN ORGANIZED HEALTH CARE EDUCATION/TRAINING PROGRAM

## 2023-07-25 PROCEDURE — 1159F MED LIST DOCD IN RCRD: CPT | Performed by: STUDENT IN AN ORGANIZED HEALTH CARE EDUCATION/TRAINING PROGRAM

## 2023-07-25 PROCEDURE — 99213 OFFICE O/P EST LOW 20 MIN: CPT | Performed by: STUDENT IN AN ORGANIZED HEALTH CARE EDUCATION/TRAINING PROGRAM

## 2023-07-25 RX ORDER — PHENAZOPYRIDINE HYDROCHLORIDE 100 MG/1
100 TABLET, FILM COATED ORAL 3 TIMES DAILY PRN
Qty: 6 TABLET | Refills: 0 | Status: SHIPPED | OUTPATIENT
Start: 2023-07-25

## 2023-07-25 RX ORDER — NITROFURANTOIN 25; 75 MG/1; MG/1
100 CAPSULE ORAL 2 TIMES DAILY
Qty: 10 CAPSULE | Refills: 0 | Status: SHIPPED | OUTPATIENT
Start: 2023-07-25

## 2023-07-25 NOTE — PROGRESS NOTES
"Chief Complaint  Lower urinary tract symptoms  Subjective         Francisca Fields is a 62 y.o. female who presents to [facility]    62 years old comes to the clinic today for an acute visit.    Reporting possible UTI, reporting dysuria and increased frequency for last few days.  Denies any abdominal symptoms/any nausea/vomiting.    Patient recently was found to have pancreatic cancer and getting treatment by heme-onc.    Reports no other acute complaints, has been losing some weight since pancreatic cancer diagnosis    Objective   Vital Signs:   Vitals:    07/25/23 1503   BP: 122/76   BP Location: Left arm   Patient Position: Sitting   Cuff Size: Adult   Pulse: 106   Resp: 16   Temp: 97.5 °F (36.4 °C)   TempSrc: Temporal   SpO2: 98%   Weight: 80.4 kg (177 lb 3.2 oz)   Height: 156 cm (61.42\")      Body mass index is 33.03 kg/m².   Wt Readings from Last 3 Encounters:   07/25/23 80.4 kg (177 lb 3.2 oz)   07/19/23 81.4 kg (179 lb 7.3 oz)   07/17/23 81.4 kg (179 lb 6.4 oz)      BP Readings from Last 3 Encounters:   07/25/23 122/76   07/19/23 118/82   07/12/23 125/71        Patient Care Team:  Arian Ching MD as PCP - General (Family Medicine)     Physical Exam  Abdominal:      Palpations: Abdomen is soft.      Tenderness: There is no abdominal tenderness.          BMI is >= 30 and <35. (Class 1 Obesity). The following options were offered after discussion;: weight loss educational material (shared in after visit summary), exercise counseling/recommendations, and nutrition counseling/recommendations              Assessment and Plan   Diagnoses and all orders for this visit:    1. Pancreatic mass (Primary)  Comments:  Pancreatic cancer; continue management as per heme-onc    2. Lower urinary tract symptoms (LUTS)  Comments:  Urine culture pending, Macrobid/Pyridium prescribed  Orders:  -     phenazopyridine (Pyridium) 100 MG tablet; Take 1 tablet by mouth 3 (Three) Times a Day As Needed " for Bladder Spasms.  Dispense: 6 tablet; Refill: 0  -     nitrofurantoin, macrocrystal-monohydrate, (Macrobid) 100 MG capsule; Take 1 capsule by mouth 2 (Two) Times a Day.  Dispense: 10 capsule; Refill: 0          Tobacco Use: Medium Risk    Smoking Tobacco Use: Former    Smokeless Tobacco Use: Never    Passive Exposure: Past            Follow Up   Return in about 5 months (around 12/25/2023), or if symptoms worsen or fail to improve, for Annual physical.  Patient was given instructions and counseling regarding her condition or for health maintenance advice. Please see specific information pulled into the AVS if appropriate.

## 2023-07-27 ENCOUNTER — TELEPHONE (OUTPATIENT)
Dept: ONCOLOGY | Facility: HOSPITAL | Age: 63
End: 2023-07-27
Payer: COMMERCIAL

## 2023-07-27 NOTE — TELEPHONE ENCOUNTER
Caller: KAYKAY ACEVEDO    Relationship: Emergency Contact    Best call back number: [phone]      What was the call regarding: WANTED TO SPEAK TO THE  REGARDING ORDERING WIGS AND TO SEE IF THERE WAS ANY OTHER ASSISTANCE THEY COULD GET

## 2023-07-28 NOTE — TELEPHONE ENCOUNTER
OSW contacted patient who wanted to know if the Cancer Center had a free resource for wigs and scarves. OSW instructed patient to ask to speak with Yesica the wig and scarf coordinator at the cancer center at her next visit. Patient stated she had her daughter to cut her hair but wanted it trimmed professionally. OSW provided her the resource of [employer] for a free hair cut for cancer patients. Patient expressed appreciation for these resources.

## 2023-08-01 RX ORDER — PACLITAXEL 100 MG/20ML
125 INJECTION, POWDER, LYOPHILIZED, FOR SUSPENSION INTRAVENOUS ONCE
OUTPATIENT
Start: 2023-08-09

## 2023-08-01 RX ORDER — SODIUM CHLORIDE 9 MG/ML
250 INJECTION, SOLUTION INTRAVENOUS ONCE
OUTPATIENT
Start: 2023-08-09

## 2023-08-01 RX ORDER — SODIUM CHLORIDE 9 MG/ML
250 INJECTION, SOLUTION INTRAVENOUS ONCE
OUTPATIENT
Start: 2023-08-16

## 2023-08-01 RX ORDER — PACLITAXEL 100 MG/20ML
125 INJECTION, POWDER, LYOPHILIZED, FOR SUSPENSION INTRAVENOUS ONCE
OUTPATIENT
Start: 2023-08-16

## 2023-08-02 ENCOUNTER — HOSPITAL ENCOUNTER (OUTPATIENT)
Dept: ONCOLOGY | Facility: HOSPITAL | Age: 63
Discharge: HOME | End: 2023-08-02
Admitting: INTERNAL MEDICINE
Payer: COMMERCIAL

## 2023-08-02 ENCOUNTER — OFFICE VISIT (OUTPATIENT)
Dept: ONCOLOGY | Facility: HOSPITAL | Age: 63
End: 2023-08-02
Payer: COMMERCIAL

## 2023-08-02 VITALS
HEART RATE: 104 BPM | SYSTOLIC BLOOD PRESSURE: 133 MMHG | HEIGHT: 61 IN | TEMPERATURE: 97.7 F | RESPIRATION RATE: 18 BRPM | OXYGEN SATURATION: 97 % | BODY MASS INDEX: 34.38 KG/M2 | WEIGHT: 182.1 LBS | DIASTOLIC BLOOD PRESSURE: 77 MMHG

## 2023-08-02 VITALS
OXYGEN SATURATION: 97 % | TEMPERATURE: 97.7 F | BODY MASS INDEX: 34.38 KG/M2 | RESPIRATION RATE: 18 BRPM | DIASTOLIC BLOOD PRESSURE: 77 MMHG | HEIGHT: 61 IN | HEART RATE: 104 BPM | SYSTOLIC BLOOD PRESSURE: 133 MMHG | WEIGHT: 182.1 LBS

## 2023-08-02 DIAGNOSIS — Z45.2 ENCOUNTER FOR ADJUSTMENT OR MANAGEMENT OF VASCULAR ACCESS DEVICE: ICD-10-CM

## 2023-08-02 DIAGNOSIS — C25.0 MALIGNANT NEOPLASM OF HEAD OF PANCREAS: Primary | ICD-10-CM

## 2023-08-02 LAB
ALBUMIN SERPL-MCNC: 3.6 G/DL (ref 3.5–5.2)
ALBUMIN/GLOB SERPL: 1.4 G/DL
ALP SERPL-CCNC: 66 U/L (ref 39–117)
ALT SERPL W P-5'-P-CCNC: 73 U/L (ref 1–33)
ANION GAP SERPL CALCULATED.3IONS-SCNC: 11.9 MMOL/L (ref 5–15)
AST SERPL-CCNC: 41 U/L (ref 1–32)
BASOPHILS # BLD AUTO: 0.02 10*3/MM3 (ref 0–0.2)
BASOPHILS NFR BLD AUTO: 0.3 % (ref 0–1.5)
BILIRUB SERPL-MCNC: <0.2 MG/DL (ref 0–1.2)
BUN SERPL-MCNC: 12 MG/DL (ref 8–23)
BUN/CREAT SERPL: 17.1 (ref 7–25)
CALCIUM SPEC-SCNC: 8.9 MG/DL (ref 8.6–10.5)
CHLORIDE SERPL-SCNC: 108 MMOL/L (ref 98–107)
CO2 SERPL-SCNC: 19.1 MMOL/L (ref 22–29)
CREAT SERPL-MCNC: 0.7 MG/DL (ref 0.57–1)
DEPRECATED RDW RBC AUTO: 45.9 FL (ref 37–54)
EGFRCR SERPLBLD CKD-EPI 2021: 97.9 ML/MIN/1.73
EOSINOPHIL # BLD AUTO: 0.22 10*3/MM3 (ref 0–0.4)
EOSINOPHIL NFR BLD AUTO: 3.7 % (ref 0.3–6.2)
ERYTHROCYTE [DISTWIDTH] IN BLOOD BY AUTOMATED COUNT: 14.9 % (ref 12.3–15.4)
GLOBULIN UR ELPH-MCNC: 2.6 GM/DL
GLUCOSE SERPL-MCNC: 205 MG/DL (ref 65–99)
HCT VFR BLD AUTO: 37 % (ref 34–46.6)
HGB BLD-MCNC: 11.6 G/DL (ref 12–15.9)
IMM GRANULOCYTES # BLD AUTO: 0.21 10*3/MM3 (ref 0–0.05)
IMM GRANULOCYTES NFR BLD AUTO: 3.5 % (ref 0–0.5)
LYMPHOCYTES # BLD AUTO: 2.18 10*3/MM3 (ref 0.7–3.1)
LYMPHOCYTES NFR BLD AUTO: 36.5 % (ref 19.6–45.3)
MCH RBC QN AUTO: 28 PG (ref 26.6–33)
MCHC RBC AUTO-ENTMCNC: 31.4 G/DL (ref 31.5–35.7)
MCV RBC AUTO: 89.4 FL (ref 79–97)
MONOCYTES # BLD AUTO: 0.81 10*3/MM3 (ref 0.1–0.9)
MONOCYTES NFR BLD AUTO: 13.5 % (ref 5–12)
NEUTROPHILS NFR BLD AUTO: 2.54 10*3/MM3 (ref 1.7–7)
NEUTROPHILS NFR BLD AUTO: 42.5 % (ref 42.7–76)
PLATELET # BLD AUTO: 496 10*3/MM3 (ref 140–450)
PMV BLD AUTO: 9.3 FL (ref 6–12)
POTASSIUM SERPL-SCNC: 3.6 MMOL/L (ref 3.5–5.2)
PROT SERPL-MCNC: 6.2 G/DL (ref 6–8.5)
RBC # BLD AUTO: 4.14 10*6/MM3 (ref 3.77–5.28)
SODIUM SERPL-SCNC: 139 MMOL/L (ref 136–145)
WBC NRBC COR # BLD: 5.98 10*3/MM3 (ref 3.4–10.8)

## 2023-08-02 PROCEDURE — 85025 COMPLETE CBC W/AUTO DIFF WBC: CPT | Performed by: INTERNAL MEDICINE

## 2023-08-02 PROCEDURE — 96375 TX/PRO/DX INJ NEW DRUG ADDON: CPT

## 2023-08-02 PROCEDURE — 25010000002 DEXAMETHASONE SODIUM PHOSPHATE 120 MG/30ML SOLUTION: Performed by: INTERNAL MEDICINE

## 2023-08-02 PROCEDURE — 25010000002 HEPARIN LOCK FLUSH PER 10 UNITS: Performed by: INTERNAL MEDICINE

## 2023-08-02 PROCEDURE — 25010000002 GEMCITABINE 1 GM/26.3ML SOLUTION 26.3 ML VIAL: Performed by: INTERNAL MEDICINE

## 2023-08-02 PROCEDURE — 96413 CHEMO IV INFUSION 1 HR: CPT

## 2023-08-02 PROCEDURE — 25010000002 PACLITAXEL PROTEIN-BOUND PART PER 1 MG: Performed by: INTERNAL MEDICINE

## 2023-08-02 PROCEDURE — 80053 COMPREHEN METABOLIC PANEL: CPT | Performed by: INTERNAL MEDICINE

## 2023-08-02 PROCEDURE — 96417 CHEMO IV INFUS EACH ADDL SEQ: CPT

## 2023-08-02 RX ORDER — SODIUM CHLORIDE 0.9 % (FLUSH) 0.9 %
20 SYRINGE (ML) INJECTION AS NEEDED
Status: DISCONTINUED | OUTPATIENT
Start: 2023-08-02 | End: 2023-08-03 | Stop reason: HOSPADM

## 2023-08-02 RX ORDER — SODIUM CHLORIDE 9 MG/ML
250 INJECTION, SOLUTION INTRAVENOUS ONCE
Status: COMPLETED | OUTPATIENT
Start: 2023-08-02 | End: 2023-08-02

## 2023-08-02 RX ORDER — HEPARIN SODIUM (PORCINE) LOCK FLUSH IV SOLN 100 UNIT/ML 100 UNIT/ML
500 SOLUTION INTRAVENOUS AS NEEDED
OUTPATIENT
Start: 2023-08-02

## 2023-08-02 RX ORDER — HEPARIN SODIUM (PORCINE) LOCK FLUSH IV SOLN 100 UNIT/ML 100 UNIT/ML
500 SOLUTION INTRAVENOUS AS NEEDED
Status: DISCONTINUED | OUTPATIENT
Start: 2023-08-02 | End: 2023-08-03 | Stop reason: HOSPADM

## 2023-08-02 RX ORDER — SODIUM CHLORIDE 0.9 % (FLUSH) 0.9 %
20 SYRINGE (ML) INJECTION AS NEEDED
OUTPATIENT
Start: 2023-08-02

## 2023-08-02 RX ORDER — PACLITAXEL 100 MG/20ML
125 INJECTION, POWDER, LYOPHILIZED, FOR SUSPENSION INTRAVENOUS ONCE
Status: COMPLETED | OUTPATIENT
Start: 2023-08-02 | End: 2023-08-02

## 2023-08-02 RX ADMIN — GEMCITABINE 1900 MG: 38 INJECTION, SOLUTION INTRAVENOUS at 11:29

## 2023-08-02 RX ADMIN — HEPARIN SODIUM (PORCINE) LOCK FLUSH IV SOLN 100 UNIT/ML 500 UNITS: 100 SOLUTION at 12:12

## 2023-08-02 RX ADMIN — PACLITAXEL 235 MG: 100 INJECTION, POWDER, LYOPHILIZED, FOR SUSPENSION INTRAVENOUS at 10:50

## 2023-08-02 RX ADMIN — Medication 20 ML: at 12:12

## 2023-08-02 RX ADMIN — DEXAMETHASONE SODIUM PHOSPHATE 12 MG: 4 INJECTION, SOLUTION INTRA-ARTICULAR; INTRALESIONAL; INTRAMUSCULAR; INTRAVENOUS; SOFT TISSUE at 10:13

## 2023-08-02 RX ADMIN — SODIUM CHLORIDE 250 ML: 9 INJECTION, SOLUTION INTRAVENOUS at 10:05

## 2023-08-09 ENCOUNTER — HOSPITAL ENCOUNTER (OUTPATIENT)
Dept: ONCOLOGY | Facility: HOSPITAL | Age: 63
Discharge: HOME | End: 2023-08-09
Admitting: INTERNAL MEDICINE
Payer: COMMERCIAL

## 2023-08-09 VITALS
BODY MASS INDEX: 33.88 KG/M2 | TEMPERATURE: 97.4 F | HEIGHT: 61 IN | OXYGEN SATURATION: 99 % | RESPIRATION RATE: 18 BRPM | SYSTOLIC BLOOD PRESSURE: 128 MMHG | DIASTOLIC BLOOD PRESSURE: 77 MMHG | HEART RATE: 105 BPM | WEIGHT: 179.45 LBS

## 2023-08-09 DIAGNOSIS — Z45.2 ENCOUNTER FOR ADJUSTMENT OR MANAGEMENT OF VASCULAR ACCESS DEVICE: Primary | ICD-10-CM

## 2023-08-09 DIAGNOSIS — N30.00 ACUTE CYSTITIS WITHOUT HEMATURIA: Primary | ICD-10-CM

## 2023-08-09 DIAGNOSIS — N30.00 ACUTE CYSTITIS WITHOUT HEMATURIA: ICD-10-CM

## 2023-08-09 DIAGNOSIS — C25.0 MALIGNANT NEOPLASM OF HEAD OF PANCREAS: ICD-10-CM

## 2023-08-09 LAB
ALBUMIN SERPL-MCNC: 3.8 G/DL (ref 3.5–5.2)
ALBUMIN/GLOB SERPL: 1.4 G/DL
ALP SERPL-CCNC: 69 U/L (ref 39–117)
ALT SERPL W P-5'-P-CCNC: 77 U/L (ref 1–33)
ANION GAP SERPL CALCULATED.3IONS-SCNC: 12.1 MMOL/L (ref 5–15)
AST SERPL-CCNC: 39 U/L (ref 1–32)
BACTERIA UR QL AUTO: ABNORMAL /HPF
BASOPHILS # BLD AUTO: 0.02 10*3/MM3 (ref 0–0.2)
BASOPHILS NFR BLD AUTO: 0.3 % (ref 0–1.5)
BILIRUB SERPL-MCNC: 0.2 MG/DL (ref 0–1.2)
BILIRUB UR QL STRIP: ABNORMAL
BUN SERPL-MCNC: 16 MG/DL (ref 8–23)
BUN/CREAT SERPL: 20.3 (ref 7–25)
CALCIUM SPEC-SCNC: 9.3 MG/DL (ref 8.6–10.5)
CHLORIDE SERPL-SCNC: 105 MMOL/L (ref 98–107)
CLARITY UR: CLEAR
CO2 SERPL-SCNC: 18.9 MMOL/L (ref 22–29)
COLOR UR: ABNORMAL
CREAT SERPL-MCNC: 0.79 MG/DL (ref 0.57–1)
DEPRECATED RDW RBC AUTO: 48.2 FL (ref 37–54)
EGFRCR SERPLBLD CKD-EPI 2021: 84.7 ML/MIN/1.73
EOSINOPHIL # BLD AUTO: 0.05 10*3/MM3 (ref 0–0.4)
EOSINOPHIL NFR BLD AUTO: 0.7 % (ref 0.3–6.2)
ERYTHROCYTE [DISTWIDTH] IN BLOOD BY AUTOMATED COUNT: 15.1 % (ref 12.3–15.4)
GLOBULIN UR ELPH-MCNC: 2.8 GM/DL
GLUCOSE SERPL-MCNC: 216 MG/DL (ref 65–99)
GLUCOSE UR STRIP-MCNC: NEGATIVE MG/DL
HCT VFR BLD AUTO: 36.9 % (ref 34–46.6)
HGB BLD-MCNC: 11.5 G/DL (ref 12–15.9)
HGB UR QL STRIP.AUTO: NEGATIVE
HYALINE CASTS UR QL AUTO: ABNORMAL /LPF
IMM GRANULOCYTES # BLD AUTO: 0.14 10*3/MM3 (ref 0–0.05)
IMM GRANULOCYTES NFR BLD AUTO: 2 % (ref 0–0.5)
KETONES UR QL STRIP: NEGATIVE
LEUKOCYTE ESTERASE UR QL STRIP.AUTO: ABNORMAL
LYMPHOCYTES # BLD AUTO: 2.01 10*3/MM3 (ref 0.7–3.1)
LYMPHOCYTES NFR BLD AUTO: 28.7 % (ref 19.6–45.3)
MCH RBC QN AUTO: 27.7 PG (ref 26.6–33)
MCHC RBC AUTO-ENTMCNC: 31.2 G/DL (ref 31.5–35.7)
MCV RBC AUTO: 88.9 FL (ref 79–97)
MONOCYTES # BLD AUTO: 0.59 10*3/MM3 (ref 0.1–0.9)
MONOCYTES NFR BLD AUTO: 8.4 % (ref 5–12)
NEUTROPHILS NFR BLD AUTO: 4.19 10*3/MM3 (ref 1.7–7)
NEUTROPHILS NFR BLD AUTO: 59.9 % (ref 42.7–76)
NITRITE UR QL STRIP: POSITIVE
PH UR STRIP.AUTO: ABNORMAL [PH]
PLATELET # BLD AUTO: 339 10*3/MM3 (ref 140–450)
PMV BLD AUTO: 9.7 FL (ref 6–12)
POTASSIUM SERPL-SCNC: 4.1 MMOL/L (ref 3.5–5.2)
PROT SERPL-MCNC: 6.6 G/DL (ref 6–8.5)
PROT UR QL STRIP: ABNORMAL
RBC # BLD AUTO: 4.15 10*6/MM3 (ref 3.77–5.28)
RBC # UR STRIP: ABNORMAL /HPF
REF LAB TEST METHOD: ABNORMAL
SODIUM SERPL-SCNC: 136 MMOL/L (ref 136–145)
SP GR UR STRIP: 1.02 (ref 1–1.03)
SQUAMOUS #/AREA URNS HPF: ABNORMAL /HPF
UROBILINOGEN UR QL STRIP: ABNORMAL
WBC # UR STRIP: ABNORMAL /HPF
WBC NRBC COR # BLD: 7 10*3/MM3 (ref 3.4–10.8)

## 2023-08-09 PROCEDURE — 25010000002 GEMCITABINE 1 GM/26.3ML SOLUTION 26.3 ML VIAL: Performed by: INTERNAL MEDICINE

## 2023-08-09 PROCEDURE — 25010000002 PACLITAXEL PROTEIN-BOUND PART PER 1 MG: Performed by: INTERNAL MEDICINE

## 2023-08-09 PROCEDURE — 25010000002 DEXAMETHASONE SODIUM PHOSPHATE 120 MG/30ML SOLUTION: Performed by: INTERNAL MEDICINE

## 2023-08-09 PROCEDURE — 80053 COMPREHEN METABOLIC PANEL: CPT | Performed by: INTERNAL MEDICINE

## 2023-08-09 PROCEDURE — 25010000002 HEPARIN LOCK FLUSH PER 10 UNITS: Performed by: INTERNAL MEDICINE

## 2023-08-09 PROCEDURE — 87186 SC STD MICRODIL/AGAR DIL: CPT | Performed by: STUDENT IN AN ORGANIZED HEALTH CARE EDUCATION/TRAINING PROGRAM

## 2023-08-09 PROCEDURE — 87086 URINE CULTURE/COLONY COUNT: CPT | Performed by: STUDENT IN AN ORGANIZED HEALTH CARE EDUCATION/TRAINING PROGRAM

## 2023-08-09 PROCEDURE — 96417 CHEMO IV INFUS EACH ADDL SEQ: CPT

## 2023-08-09 PROCEDURE — 81001 URINALYSIS AUTO W/SCOPE: CPT | Performed by: STUDENT IN AN ORGANIZED HEALTH CARE EDUCATION/TRAINING PROGRAM

## 2023-08-09 PROCEDURE — 96375 TX/PRO/DX INJ NEW DRUG ADDON: CPT

## 2023-08-09 PROCEDURE — 96413 CHEMO IV INFUSION 1 HR: CPT

## 2023-08-09 PROCEDURE — 85025 COMPLETE CBC W/AUTO DIFF WBC: CPT | Performed by: INTERNAL MEDICINE

## 2023-08-09 PROCEDURE — 87077 CULTURE AEROBIC IDENTIFY: CPT | Performed by: STUDENT IN AN ORGANIZED HEALTH CARE EDUCATION/TRAINING PROGRAM

## 2023-08-09 RX ORDER — SODIUM CHLORIDE 0.9 % (FLUSH) 0.9 %
20 SYRINGE (ML) INJECTION AS NEEDED
Status: DISCONTINUED | OUTPATIENT
Start: 2023-08-09 | End: 2023-08-10 | Stop reason: HOSPADM

## 2023-08-09 RX ORDER — SODIUM CHLORIDE 9 MG/ML
250 INJECTION, SOLUTION INTRAVENOUS ONCE
Status: COMPLETED | OUTPATIENT
Start: 2023-08-09 | End: 2023-08-09

## 2023-08-09 RX ORDER — CEFDINIR 300 MG/1
300 CAPSULE ORAL 2 TIMES DAILY
Qty: 20 CAPSULE | Refills: 0 | Status: SHIPPED | OUTPATIENT
Start: 2023-08-09

## 2023-08-09 RX ORDER — HEPARIN SODIUM (PORCINE) LOCK FLUSH IV SOLN 100 UNIT/ML 100 UNIT/ML
500 SOLUTION INTRAVENOUS AS NEEDED
Status: DISCONTINUED | OUTPATIENT
Start: 2023-08-09 | End: 2023-08-10 | Stop reason: HOSPADM

## 2023-08-09 RX ORDER — PACLITAXEL 100 MG/20ML
125 INJECTION, POWDER, LYOPHILIZED, FOR SUSPENSION INTRAVENOUS ONCE
Status: COMPLETED | OUTPATIENT
Start: 2023-08-09 | End: 2023-08-09

## 2023-08-09 RX ORDER — HEPARIN SODIUM (PORCINE) LOCK FLUSH IV SOLN 100 UNIT/ML 100 UNIT/ML
500 SOLUTION INTRAVENOUS AS NEEDED
OUTPATIENT
Start: 2023-08-09

## 2023-08-09 RX ORDER — SODIUM CHLORIDE 0.9 % (FLUSH) 0.9 %
20 SYRINGE (ML) INJECTION AS NEEDED
OUTPATIENT
Start: 2023-08-09

## 2023-08-09 RX ADMIN — Medication 20 ML: at 12:43

## 2023-08-09 RX ADMIN — SODIUM CHLORIDE 250 ML: 9 INJECTION, SOLUTION INTRAVENOUS at 10:15

## 2023-08-09 RX ADMIN — HEPARIN SODIUM (PORCINE) LOCK FLUSH IV SOLN 100 UNIT/ML 500 UNITS: 100 SOLUTION at 12:43

## 2023-08-09 RX ADMIN — DEXAMETHASONE SODIUM PHOSPHATE 12 MG: 4 INJECTION, SOLUTION INTRA-ARTICULAR; INTRALESIONAL; INTRAMUSCULAR; INTRAVENOUS; SOFT TISSUE at 10:16

## 2023-08-09 RX ADMIN — PACLITAXEL 235 MG: 100 INJECTION, POWDER, LYOPHILIZED, FOR SUSPENSION INTRAVENOUS at 11:13

## 2023-08-09 RX ADMIN — GEMCITABINE 1900 MG: 38 INJECTION, SOLUTION INTRAVENOUS at 12:07

## 2023-08-11 LAB — BACTERIA SPEC AEROBE CULT: ABNORMAL

## 2023-08-16 ENCOUNTER — HOSPITAL ENCOUNTER (OUTPATIENT)
Dept: ONCOLOGY | Facility: HOSPITAL | Age: 63
Discharge: HOME | End: 2023-08-16
Admitting: INTERNAL MEDICINE
Payer: COMMERCIAL

## 2023-08-16 VITALS
BODY MASS INDEX: 33.59 KG/M2 | HEIGHT: 61 IN | HEART RATE: 115 BPM | WEIGHT: 177.91 LBS | TEMPERATURE: 98.4 F | RESPIRATION RATE: 18 BRPM | OXYGEN SATURATION: 97 %

## 2023-08-16 DIAGNOSIS — C25.0 MALIGNANT NEOPLASM OF HEAD OF PANCREAS: Primary | ICD-10-CM

## 2023-08-16 DIAGNOSIS — C25.0 MALIGNANT NEOPLASM OF HEAD OF PANCREAS: ICD-10-CM

## 2023-08-16 DIAGNOSIS — Z45.2 ENCOUNTER FOR ADJUSTMENT OR MANAGEMENT OF VASCULAR ACCESS DEVICE: Primary | ICD-10-CM

## 2023-08-16 LAB
ALBUMIN SERPL-MCNC: 3.8 G/DL (ref 3.5–5.2)
ALBUMIN/GLOB SERPL: 1.3 G/DL
ALP SERPL-CCNC: 65 U/L (ref 39–117)
ALT SERPL W P-5'-P-CCNC: 63 U/L (ref 1–33)
ANION GAP SERPL CALCULATED.3IONS-SCNC: 10.9 MMOL/L (ref 5–15)
AST SERPL-CCNC: 34 U/L (ref 1–32)
BASOPHILS # BLD AUTO: 0.02 10*3/MM3 (ref 0–0.2)
BASOPHILS NFR BLD AUTO: 0.3 % (ref 0–1.5)
BILIRUB SERPL-MCNC: 0.3 MG/DL (ref 0–1.2)
BUN SERPL-MCNC: 16 MG/DL (ref 8–23)
BUN/CREAT SERPL: 20 (ref 7–25)
CALCIUM SPEC-SCNC: 9.3 MG/DL (ref 8.6–10.5)
CANCER AG19-9 SERPL-ACNC: 231 U/ML
CHLORIDE SERPL-SCNC: 102 MMOL/L (ref 98–107)
CO2 SERPL-SCNC: 18.1 MMOL/L (ref 22–29)
CREAT SERPL-MCNC: 0.8 MG/DL (ref 0.57–1)
DEPRECATED RDW RBC AUTO: 46.2 FL (ref 37–54)
EGFRCR SERPLBLD CKD-EPI 2021: 83.4 ML/MIN/1.73
EOSINOPHIL # BLD AUTO: 0.18 10*3/MM3 (ref 0–0.4)
EOSINOPHIL NFR BLD AUTO: 2.7 % (ref 0.3–6.2)
ERYTHROCYTE [DISTWIDTH] IN BLOOD BY AUTOMATED COUNT: 14.7 % (ref 12.3–15.4)
GLOBULIN UR ELPH-MCNC: 2.9 GM/DL
GLUCOSE SERPL-MCNC: 191 MG/DL (ref 65–99)
HCT VFR BLD AUTO: 36.4 % (ref 34–46.6)
HGB BLD-MCNC: 11.4 G/DL (ref 12–15.9)
IMM GRANULOCYTES # BLD AUTO: 0.12 10*3/MM3 (ref 0–0.05)
IMM GRANULOCYTES NFR BLD AUTO: 1.8 % (ref 0–0.5)
LYMPHOCYTES # BLD AUTO: 2.02 10*3/MM3 (ref 0.7–3.1)
LYMPHOCYTES NFR BLD AUTO: 30.5 % (ref 19.6–45.3)
MCH RBC QN AUTO: 27.5 PG (ref 26.6–33)
MCHC RBC AUTO-ENTMCNC: 31.3 G/DL (ref 31.5–35.7)
MCV RBC AUTO: 87.9 FL (ref 79–97)
MONOCYTES # BLD AUTO: 0.61 10*3/MM3 (ref 0.1–0.9)
MONOCYTES NFR BLD AUTO: 9.2 % (ref 5–12)
NEUTROPHILS NFR BLD AUTO: 3.68 10*3/MM3 (ref 1.7–7)
NEUTROPHILS NFR BLD AUTO: 55.5 % (ref 42.7–76)
PLATELET # BLD AUTO: 182 10*3/MM3 (ref 140–450)
PMV BLD AUTO: 9.8 FL (ref 6–12)
POTASSIUM SERPL-SCNC: 3.8 MMOL/L (ref 3.5–5.2)
PROT SERPL-MCNC: 6.7 G/DL (ref 6–8.5)
RBC # BLD AUTO: 4.14 10*6/MM3 (ref 3.77–5.28)
SODIUM SERPL-SCNC: 131 MMOL/L (ref 136–145)
WBC NRBC COR # BLD: 6.63 10*3/MM3 (ref 3.4–10.8)

## 2023-08-16 PROCEDURE — 25010000002 GEMCITABINE 1 GM/26.3ML SOLUTION 26.3 ML VIAL: Performed by: INTERNAL MEDICINE

## 2023-08-16 PROCEDURE — 25010000002 DEXAMETHASONE SODIUM PHOSPHATE 120 MG/30ML SOLUTION: Performed by: INTERNAL MEDICINE

## 2023-08-16 PROCEDURE — 80053 COMPREHEN METABOLIC PANEL: CPT | Performed by: INTERNAL MEDICINE

## 2023-08-16 PROCEDURE — 25010000002 PACLITAXEL PROTEIN-BOUND PART PER 1 MG: Performed by: INTERNAL MEDICINE

## 2023-08-16 PROCEDURE — 86301 IMMUNOASSAY TUMOR CA 19-9: CPT | Performed by: INTERNAL MEDICINE

## 2023-08-16 PROCEDURE — 96375 TX/PRO/DX INJ NEW DRUG ADDON: CPT

## 2023-08-16 PROCEDURE — 25010000002 HEPARIN LOCK FLUSH PER 10 UNITS: Performed by: INTERNAL MEDICINE

## 2023-08-16 PROCEDURE — 96413 CHEMO IV INFUSION 1 HR: CPT

## 2023-08-16 PROCEDURE — 96417 CHEMO IV INFUS EACH ADDL SEQ: CPT

## 2023-08-16 PROCEDURE — 85025 COMPLETE CBC W/AUTO DIFF WBC: CPT | Performed by: INTERNAL MEDICINE

## 2023-08-16 RX ORDER — SODIUM CHLORIDE 0.9 % (FLUSH) 0.9 %
20 SYRINGE (ML) INJECTION AS NEEDED
OUTPATIENT
Start: 2023-08-16

## 2023-08-16 RX ORDER — PACLITAXEL 100 MG/20ML
125 INJECTION, POWDER, LYOPHILIZED, FOR SUSPENSION INTRAVENOUS ONCE
Status: COMPLETED | OUTPATIENT
Start: 2023-08-16 | End: 2023-08-16

## 2023-08-16 RX ORDER — SODIUM CHLORIDE 9 MG/ML
250 INJECTION, SOLUTION INTRAVENOUS ONCE
Status: COMPLETED | OUTPATIENT
Start: 2023-08-16 | End: 2023-08-16

## 2023-08-16 RX ORDER — HEPARIN SODIUM (PORCINE) LOCK FLUSH IV SOLN 100 UNIT/ML 100 UNIT/ML
500 SOLUTION INTRAVENOUS AS NEEDED
OUTPATIENT
Start: 2023-08-16

## 2023-08-16 RX ORDER — HEPARIN SODIUM (PORCINE) LOCK FLUSH IV SOLN 100 UNIT/ML 100 UNIT/ML
500 SOLUTION INTRAVENOUS AS NEEDED
Status: DISCONTINUED | OUTPATIENT
Start: 2023-08-16 | End: 2023-08-17 | Stop reason: HOSPADM

## 2023-08-16 RX ORDER — SODIUM CHLORIDE 0.9 % (FLUSH) 0.9 %
20 SYRINGE (ML) INJECTION AS NEEDED
Status: DISCONTINUED | OUTPATIENT
Start: 2023-08-16 | End: 2023-08-17 | Stop reason: HOSPADM

## 2023-08-16 RX ADMIN — DEXAMETHASONE SODIUM PHOSPHATE 12 MG: 4 INJECTION, SOLUTION INTRA-ARTICULAR; INTRALESIONAL; INTRAMUSCULAR; INTRAVENOUS; SOFT TISSUE at 09:14

## 2023-08-16 RX ADMIN — SODIUM CHLORIDE 250 ML: 9 INJECTION, SOLUTION INTRAVENOUS at 09:08

## 2023-08-16 RX ADMIN — HEPARIN SODIUM (PORCINE) LOCK FLUSH IV SOLN 100 UNIT/ML 500 UNITS: 100 SOLUTION at 11:21

## 2023-08-16 RX ADMIN — Medication 20 ML: at 11:21

## 2023-08-16 RX ADMIN — PACLITAXEL 235 MG: 100 INJECTION, POWDER, LYOPHILIZED, FOR SUSPENSION INTRAVENOUS at 09:43

## 2023-08-16 RX ADMIN — GEMCITABINE HYDROCHLORIDE 1900 MG: 1 INJECTION, SOLUTION INTRAVENOUS at 10:42

## 2023-08-17 ENCOUNTER — TELEPHONE (OUTPATIENT)
Dept: ONCOLOGY | Facility: HOSPITAL | Age: 63
End: 2023-08-17

## 2023-08-17 NOTE — TELEPHONE ENCOUNTER
Caller: Francisca Fields    Relationship to patient: Self    Best call back number: [phone]    Chief complaint: OUT OF TOWN     Type of visit: INFUSION & F/U 1     Requested date: CALL TO R/S     If rescheduling, when is the original appointment: 8/30/2023

## 2023-08-21 ENCOUNTER — TELEPHONE (OUTPATIENT)
Dept: ONCOLOGY | Facility: HOSPITAL | Age: 63
End: 2023-08-21
Payer: COMMERCIAL

## 2023-08-21 ENCOUNTER — TELEPHONE (OUTPATIENT)
Dept: ONCOLOGY | Facility: HOSPITAL | Age: 63
End: 2023-08-21

## 2023-08-21 DIAGNOSIS — G89.3 CANCER RELATED PAIN: ICD-10-CM

## 2023-08-21 DIAGNOSIS — C25.0 MALIGNANT NEOPLASM OF HEAD OF PANCREAS: ICD-10-CM

## 2023-08-21 RX ORDER — HYDROCODONE BITARTRATE AND ACETAMINOPHEN 7.5; 325 MG/1; MG/1
1 TABLET ORAL EVERY 6 HOURS PRN
Qty: 120 TABLET | Refills: 0 | OUTPATIENT
Start: 2023-08-21 | End: 2023-09-20

## 2023-08-21 NOTE — TELEPHONE ENCOUNTER
Caller: Diamond Francisca    Relationship: Self    Best call back number: [phone]    Requested Prescriptions:   Requested Prescriptions     Pending Prescriptions Disp Refills    HYDROcodone-acetaminophen (NORCO) 7.5-325 MG per tablet 120 tablet 0     Sig: Take 1 tablet by mouth Every 6 (Six) Hours As Needed for Severe Pain for up to 30 days.        Pharm[facility] where request should be sent: [id number] [place] [facility] [address] [facility] [phone] [facility] [phone]      Last office visit with prescribing clinician: 6/27/2023   Last telemedicine visit with prescribing clinician: Visit date not found   Next office visit with prescribing clinician: 8/30/2023     Additional details provided by patient: PT IS GOING OUT OF TOWN AND WANTED TO KNOW IF THIS COULD BE FILLED 1 DAY EARLY     Does the patient have less than a 3 day supply:  [] Yes  [x] No      Terrance Segura Rep   08/21/23 13:42 EDT

## 2023-08-21 NOTE — TELEPHONE ENCOUNTER
Caller: Francisca Fields    Relationship to patient: Self    Best call back number: [phone]    Chief complaint: PT CALLED TO RESCHEDULE     Type of visit: INFUSION AND FOLLOW UP    Requested date: WILL BE OUT OF TOWN, SHE WILL BE BACK 9-2-23      If rescheduling, when is the original appointment: 8-30-23

## 2023-08-21 NOTE — TELEPHONE ENCOUNTER
Caller: Francisca Fields    Relationship: Self    Best call back number: [phone]    What is the best time to reach you: ASAP    Who are you requesting to speak with (clinical staff, provider,  specific staff member): CLINICAL        What was the call regarding: PATIENT CALLED BECAUSE SHE IS IN A LOT OF PAIN IN HER TONGUE FINGERTIPS AND IN HER LEGS.    Is it okay if the provider responds through MyChart: NO

## 2023-08-22 DIAGNOSIS — T45.1X5A NEUROPATHY DUE TO CHEMOTHERAPEUTIC DRUG: ICD-10-CM

## 2023-08-22 DIAGNOSIS — G89.3 CANCER RELATED PAIN: ICD-10-CM

## 2023-08-22 DIAGNOSIS — K12.30 MUCOSITIS: Primary | ICD-10-CM

## 2023-08-22 DIAGNOSIS — C25.0 MALIGNANT NEOPLASM OF HEAD OF PANCREAS: ICD-10-CM

## 2023-08-22 DIAGNOSIS — G62.0 NEUROPATHY DUE TO CHEMOTHERAPEUTIC DRUG: ICD-10-CM

## 2023-08-22 RX ORDER — DIPHENHYDRAMINE HYDROCHLORIDE AND LIDOCAINE HYDROCHLORIDE AND ALUMINUM HYDROXIDE AND MAGNESIUM HYDRO
10 KIT EVERY 6 HOURS
Qty: 237 ML | Refills: 3 | Status: SHIPPED | OUTPATIENT
Start: 2023-08-22

## 2023-08-22 RX ORDER — DULOXETIN HYDROCHLORIDE 20 MG/1
20 CAPSULE, DELAYED RELEASE ORAL DAILY
Qty: 30 CAPSULE | Refills: 3 | Status: SHIPPED | OUTPATIENT
Start: 2023-08-22

## 2023-08-22 NOTE — TELEPHONE ENCOUNTER
Informed patient that a mouthwash had been sent in for her sore tongue and that a rx for cymbalta would also be sent in for the neuropathy in her hands and feet. Patient v/u of these orders. Patient also informed this nurse that she would not be here for next week's treatment and that they would need to be rescheduled.

## 2023-08-22 NOTE — TELEPHONE ENCOUNTER
Caller: Francisca Fields    Relationship: Self    Best call back number: [phone]    Requested Prescriptions:   Requested Prescriptions     Pending Prescriptions Disp Refills    HYDROcodone-acetaminophen (NORCO) 7.5-325 MG per tablet 120 tablet 0     Sig: Take 1 tablet by mouth Every 6 (Six) Hours As Needed for Severe Pain for up to 30 days.        Pharm[facility] request [facility] be sent: [id number] [place] - [address] AT [facility] [phone] [facility] [phone]      Last office visit with prescribing clinician: 6/27/2023   Last telemedicine visit with prescribing clinician: Visit date not found   Next office visit with prescribing clinician: 9/6/2023       Does the patient have less than a 3 day supply:  [x] Yes  [] No        Terrance Segura Rep   08/22/23 15:30 EDT

## 2023-08-23 ENCOUNTER — HOSPITAL ENCOUNTER (OUTPATIENT)
Dept: CT IMAGING | Facility: HOSPITAL | Age: 63
Discharge: HOME | End: 2023-08-23
Admitting: INTERNAL MEDICINE
Payer: COMMERCIAL

## 2023-08-23 DIAGNOSIS — C25.0 MALIGNANT NEOPLASM OF HEAD OF PANCREAS: ICD-10-CM

## 2023-08-23 PROCEDURE — 71260 CT THORAX DX C+: CPT

## 2023-08-23 PROCEDURE — 25510000001 IOPAMIDOL PER 1 ML: Performed by: INTERNAL MEDICINE

## 2023-08-23 PROCEDURE — 74177 CT ABD & PELVIS W/CONTRAST: CPT

## 2023-08-23 RX ORDER — HYDROCODONE BITARTRATE AND ACETAMINOPHEN 7.5; 325 MG/1; MG/1
1 TABLET ORAL EVERY 6 HOURS PRN
Qty: 120 TABLET | Refills: 0 | Status: SHIPPED | OUTPATIENT
Start: 2023-08-23 | End: 2023-09-22

## 2023-08-23 RX ADMIN — IOPAMIDOL 100 ML: 755 INJECTION, SOLUTION INTRAVENOUS at 16:17

## 2023-08-30 ENCOUNTER — TELEPHONE (OUTPATIENT)
Dept: ONCOLOGY | Facility: HOSPITAL | Age: 63
End: 2023-08-30

## 2023-08-30 NOTE — TELEPHONE ENCOUNTER
Caller: Francisca Fields    Relationship: Self    Best call back number: [phone]    What was the call regarding: FRANCISCA CALLED REGARDING HER UPCOMING SURGERY. SHE WANTS TO LET DR. FRITZ KNOW, THAT NO MATTER WHAT HAPPENS WITH HER SURGERY, SHE NO LONGER WANTS TO PURSUE ANY OTHER TREATMENT.

## 2023-09-30 ENCOUNTER — APPOINTMENT (OUTPATIENT)
Dept: CT IMAGING | Facility: HOSPITAL | Age: 63
End: 2023-09-30
Payer: COMMERCIAL

## 2023-09-30 ENCOUNTER — HOSPITAL ENCOUNTER (EMERGENCY)
Facility: HOSPITAL | Age: 63
Discharge: HOME | End: 2023-09-30
Attending: EMERGENCY MEDICINE
Payer: COMMERCIAL

## 2023-09-30 VITALS
HEIGHT: 62 IN | OXYGEN SATURATION: 94 % | BODY MASS INDEX: 32.02 KG/M2 | HEART RATE: 88 BPM | TEMPERATURE: 98.9 F | RESPIRATION RATE: 20 BRPM | SYSTOLIC BLOOD PRESSURE: 103 MMHG | WEIGHT: 174 LBS | DIASTOLIC BLOOD PRESSURE: 64 MMHG

## 2023-09-30 DIAGNOSIS — G89.3 CANCER RELATED PAIN: Primary | ICD-10-CM

## 2023-09-30 DIAGNOSIS — N39.0 ACUTE UTI: ICD-10-CM

## 2023-09-30 DIAGNOSIS — C25.0 MALIGNANT NEOPLASM OF HEAD OF PANCREAS: ICD-10-CM

## 2023-09-30 LAB
ALBUMIN SERPL-MCNC: 2.5 G/DL (ref 3.5–5.2)
ALBUMIN/GLOB SERPL: 0.7 G/DL
ALP SERPL-CCNC: 71 U/L (ref 39–117)
ALT SERPL W P-5'-P-CCNC: 9 U/L (ref 1–33)
ANION GAP SERPL CALCULATED.3IONS-SCNC: 9.4 MMOL/L (ref 5–15)
AST SERPL-CCNC: 22 U/L (ref 1–32)
BACTERIA UR QL AUTO: ABNORMAL /HPF
BASOPHILS # BLD AUTO: 0.07 10*3/MM3 (ref 0–0.2)
BASOPHILS NFR BLD AUTO: 0.3 % (ref 0–1.5)
BILIRUB SERPL-MCNC: 0.2 MG/DL (ref 0–1.2)
BILIRUB UR QL STRIP: ABNORMAL
BUN SERPL-MCNC: 13 MG/DL (ref 8–23)
BUN/CREAT SERPL: 19.4 (ref 7–25)
CALCIUM SPEC-SCNC: 8.8 MG/DL (ref 8.6–10.5)
CHLORIDE SERPL-SCNC: 102 MMOL/L (ref 98–107)
CLARITY UR: ABNORMAL
CO2 SERPL-SCNC: 23.6 MMOL/L (ref 22–29)
COD CRY URNS QL: ABNORMAL /HPF
COLOR UR: ABNORMAL
CREAT SERPL-MCNC: 0.67 MG/DL (ref 0.57–1)
DEPRECATED RDW RBC AUTO: 55.2 FL (ref 37–54)
EGFRCR SERPLBLD CKD-EPI 2021: 99 ML/MIN/1.73
EOSINOPHIL # BLD AUTO: 0.54 10*3/MM3 (ref 0–0.4)
EOSINOPHIL NFR BLD AUTO: 2.5 % (ref 0.3–6.2)
ERYTHROCYTE [DISTWIDTH] IN BLOOD BY AUTOMATED COUNT: 16.8 % (ref 12.3–15.4)
GLOBULIN UR ELPH-MCNC: 3.8 GM/DL
GLUCOSE SERPL-MCNC: 140 MG/DL (ref 65–99)
GLUCOSE UR STRIP-MCNC: NEGATIVE MG/DL
HCT VFR BLD AUTO: 30 % (ref 34–46.6)
HGB BLD-MCNC: 9.4 G/DL (ref 12–15.9)
HGB UR QL STRIP.AUTO: ABNORMAL
HOLD SPECIMEN: NORMAL
HYALINE CASTS UR QL AUTO: ABNORMAL /LPF
IMM GRANULOCYTES # BLD AUTO: 0.25 10*3/MM3 (ref 0–0.05)
IMM GRANULOCYTES NFR BLD AUTO: 1.2 % (ref 0–0.5)
KETONES UR QL STRIP: ABNORMAL
LEUKOCYTE ESTERASE UR QL STRIP.AUTO: ABNORMAL
LIPASE SERPL-CCNC: 11 U/L (ref 13–60)
LYMPHOCYTES # BLD AUTO: 1.95 10*3/MM3 (ref 0.7–3.1)
LYMPHOCYTES NFR BLD AUTO: 9 % (ref 19.6–45.3)
MCH RBC QN AUTO: 28.1 PG (ref 26.6–33)
MCHC RBC AUTO-ENTMCNC: 31.3 G/DL (ref 31.5–35.7)
MCV RBC AUTO: 89.8 FL (ref 79–97)
MONOCYTES # BLD AUTO: 1.52 10*3/MM3 (ref 0.1–0.9)
MONOCYTES NFR BLD AUTO: 7 % (ref 5–12)
NEUTROPHILS NFR BLD AUTO: 17.27 10*3/MM3 (ref 1.7–7)
NEUTROPHILS NFR BLD AUTO: 80 % (ref 42.7–76)
NITRITE UR QL STRIP: POSITIVE
NRBC BLD AUTO-RTO: 0 /100 WBC (ref 0–0.2)
PH UR STRIP.AUTO: 5.5 [PH] (ref 5–8)
PLATELET # BLD AUTO: 556 10*3/MM3 (ref 140–450)
PMV BLD AUTO: 9.8 FL (ref 6–12)
POTASSIUM SERPL-SCNC: 3.7 MMOL/L (ref 3.5–5.2)
PROT SERPL-MCNC: 6.3 G/DL (ref 6–8.5)
PROT UR QL STRIP: ABNORMAL
RBC # BLD AUTO: 3.34 10*6/MM3 (ref 3.77–5.28)
RBC # UR STRIP: ABNORMAL /HPF
REF LAB TEST METHOD: ABNORMAL
SODIUM SERPL-SCNC: 135 MMOL/L (ref 136–145)
SP GR UR STRIP: >=1.03 (ref 1–1.03)
SQUAMOUS #/AREA URNS HPF: ABNORMAL /HPF
UROBILINOGEN UR QL STRIP: ABNORMAL
WBC # UR STRIP: ABNORMAL /HPF
WBC NRBC COR # BLD: 21.6 10*3/MM3 (ref 3.4–10.8)

## 2023-09-30 PROCEDURE — 74177 CT ABD & PELVIS W/CONTRAST: CPT

## 2023-09-30 PROCEDURE — 99285 EMERGENCY DEPT VISIT HI MDM: CPT

## 2023-09-30 PROCEDURE — 87086 URINE CULTURE/COLONY COUNT: CPT | Performed by: EMERGENCY MEDICINE

## 2023-09-30 PROCEDURE — 36415 COLL VENOUS BLD VENIPUNCTURE: CPT

## 2023-09-30 PROCEDURE — 25010000002 CEFTRIAXONE PER 250 MG: Performed by: EMERGENCY MEDICINE

## 2023-09-30 PROCEDURE — 80053 COMPREHEN METABOLIC PANEL: CPT | Performed by: EMERGENCY MEDICINE

## 2023-09-30 PROCEDURE — 83690 ASSAY OF LIPASE: CPT | Performed by: EMERGENCY MEDICINE

## 2023-09-30 PROCEDURE — 87186 SC STD MICRODIL/AGAR DIL: CPT | Performed by: EMERGENCY MEDICINE

## 2023-09-30 PROCEDURE — 81001 URINALYSIS AUTO W/SCOPE: CPT | Performed by: EMERGENCY MEDICINE

## 2023-09-30 PROCEDURE — 25010000002 ONDANSETRON PER 1 MG: Performed by: EMERGENCY MEDICINE

## 2023-09-30 PROCEDURE — 71260 CT THORAX DX C+: CPT

## 2023-09-30 PROCEDURE — 25010000002 HYDROMORPHONE 1 MG/ML SOLUTION: Performed by: EMERGENCY MEDICINE

## 2023-09-30 PROCEDURE — 85025 COMPLETE CBC W/AUTO DIFF WBC: CPT | Performed by: EMERGENCY MEDICINE

## 2023-09-30 PROCEDURE — 96365 THER/PROPH/DIAG IV INF INIT: CPT

## 2023-09-30 PROCEDURE — 96375 TX/PRO/DX INJ NEW DRUG ADDON: CPT

## 2023-09-30 PROCEDURE — 87077 CULTURE AEROBIC IDENTIFY: CPT | Performed by: EMERGENCY MEDICINE

## 2023-09-30 PROCEDURE — 25510000001 IOPAMIDOL PER 1 ML: Performed by: EMERGENCY MEDICINE

## 2023-09-30 RX ORDER — HYDROCODONE BITARTRATE AND ACETAMINOPHEN 5; 325 MG/1; MG/1
1-2 TABLET ORAL EVERY 6 HOURS PRN
COMMUNITY
Start: 2023-09-26 | End: 2023-10-01

## 2023-09-30 RX ORDER — HYDROMORPHONE HYDROCHLORIDE 2 MG/1
2 TABLET ORAL EVERY 4 HOURS PRN
Qty: 24 TABLET | Refills: 0 | Status: SHIPPED | OUTPATIENT
Start: 2023-09-30

## 2023-09-30 RX ORDER — CEPHALEXIN 500 MG/1
500 CAPSULE ORAL 2 TIMES DAILY
Qty: 14 CAPSULE | Refills: 0 | Status: SHIPPED | OUTPATIENT
Start: 2023-09-30 | End: 2023-10-07

## 2023-09-30 RX ORDER — ONDANSETRON 4 MG/1
4 TABLET, ORALLY DISINTEGRATING ORAL EVERY 6 HOURS PRN
Qty: 21 TABLET | Refills: 0 | Status: SHIPPED | OUTPATIENT
Start: 2023-09-30

## 2023-09-30 RX ORDER — POLYETHYLENE GLYCOL 3350 17 G/17G
17 POWDER, FOR SOLUTION ORAL
COMMUNITY
Start: 2023-09-15

## 2023-09-30 RX ORDER — ONDANSETRON 2 MG/ML
4 INJECTION INTRAMUSCULAR; INTRAVENOUS ONCE
Status: COMPLETED | OUTPATIENT
Start: 2023-09-30 | End: 2023-09-30

## 2023-09-30 RX ORDER — CEFTRIAXONE SODIUM 1 G/50ML
1000 INJECTION, SOLUTION INTRAVENOUS ONCE
Status: COMPLETED | OUTPATIENT
Start: 2023-09-30 | End: 2023-09-30

## 2023-09-30 RX ORDER — GABAPENTIN 100 MG/1
100 CAPSULE ORAL 3 TIMES DAILY
Qty: 90 CAPSULE | Refills: 0 | COMMUNITY
Start: 2023-09-26 | End: 2023-10-26

## 2023-09-30 RX ORDER — SODIUM CHLORIDE 0.9 % (FLUSH) 0.9 %
10 SYRINGE (ML) INJECTION AS NEEDED
Status: DISCONTINUED | OUTPATIENT
Start: 2023-09-30 | End: 2023-09-30 | Stop reason: HOSPADM

## 2023-09-30 RX ADMIN — ONDANSETRON 4 MG: 2 INJECTION INTRAMUSCULAR; INTRAVENOUS at 04:14

## 2023-09-30 RX ADMIN — CEFTRIAXONE SODIUM 1000 MG: 1 INJECTION, SOLUTION INTRAVENOUS at 05:45

## 2023-09-30 RX ADMIN — HYDROMORPHONE HYDROCHLORIDE 0.5 MG: 1 INJECTION, SOLUTION INTRAMUSCULAR; INTRAVENOUS; SUBCUTANEOUS at 04:14

## 2023-09-30 RX ADMIN — SODIUM CHLORIDE 1000 ML: 9 INJECTION, SOLUTION INTRAVENOUS at 04:15

## 2023-09-30 RX ADMIN — IOPAMIDOL 100 ML: 755 INJECTION, SOLUTION INTRAVENOUS at 04:31

## 2023-09-30 NOTE — ED NOTES
PT ARRIVED TO ED WITH DAUGHTERS WITH COMPLAINTS OF RUQ PAIN THAT RADIATES TO BACK AND FLANK AREA. PANCREATIC CANCER DX IN JUNE, [facility] SURGERY 3 WEEKS AGO, MASS REMOVED FROM PANCREAS, GALLBLADDER ALSO REMOVED AND HEAD OF PANCREASE, CANCER HAS METASTASIZED. CHEMO WAS STOPPED 4 WEEKS AGO. PT HAVING NAUSEA BUT NO VOMITING. NO FEVERS. PT STOPPED LOVENOX 1 WEEK AGO AND REFUSED TO TAKE IT ANYMORE. ABDOMEN IS TENDER TO PALPATION. NO SIGNS OR SYMPTOMS NOTED ON THE ABDOMINAL INCISIONS.

## 2023-09-30 NOTE — ED PROVIDER NOTES
Time: 3:05 AM EDT  Date of encounter:  2023  Independent Historian/Clinical History and Information was obtained by:   Patient and Family    History is limited by: N/A    Chief Complaint: Abdominal pain      History of Present Illness:  Patient is a 62 y.o. year old female who presents to the emergency department for evaluation of right upper quadrant abdominal pain.  Patient has had a recent Whipple procedure and saw her surgical oncology team 2 days ago.  This was felt to be postoperative expected pain, however family and patient feel that in the past 3 to 4 days this is unusual and or worsening beyond their expectation.  Pain is worse with deep inspiration.  Nausea but no vomiting.  Afebrile.  No bowel changes.    HPI    Patient Care Team  Primary Care Provider: Arian Ching MD    Past Medical History:     Allergies   Allergen Reactions    Bee Venom Anaphylaxis, Anxiety, Dizziness, Hives, Itching, Rash, Shortness Of Breath and Swelling    Codeine Itching     Past Medical History:   Diagnosis Date    Arthritis     DDD (degenerative disc disease), cervical     DDD (degenerative disc disease), lumbar     Flat back syndrome     Herpes zoster without complication     Nauseous     Pancreatic cancer     Scoliosis     SOBOE (shortness of breath on exertion)     Trochanteric bursitis of both hips     Visual impairment      Past Surgical History:   Procedure Laterality Date    APPENDECTOMY       SECTION      PANCREAS SURGERY      TUBAL ABDOMINAL LIGATION      VENOUS ACCESS DEVICE (PORT) INSERTION N/A 2023    Procedure: INSERTION VENOUS ACCESS DEVICE;  Surgeon: Devon Long MD;  Location: [facility] OR [facility];  Service: General;  Laterality: N/A;    WHIPPLE PROCEDURE W/ LAPAROSCOPY Right      Family History   Problem Relation Age of Onset    Arthritis Mother     Other Mother         Dementia    Diabetes Father     Other Father         Dementia    Cancer Maternal Grandmother     Cancer Paternal Grandfather      Diabetes Paternal Grandmother        Home Medications:  Prior to Admission medications    Medication Sig Start Date End Date Taking? Authorizing Provider   cefdinir (OMNICEF) 300 MG capsule Take 1 capsule by mouth 2 (Two) Times a Day. 23   Arian Ching MD   cyclobenzaprine (FLEXERIL) 10 MG tablet Take 1 tablet by mouth 2 (Two) Times a Day As Needed for Muscle Spasms. 23   Madhav Odonnell MD   DPH-Lido-AlHydr-MgHydr-Simeth (First Mouthwash, Magic Mouthwash,) suspension Swish and spit 10 mL Every 6 (Six) Hours. 23   Madhav Odonnell MD   DULoxetine (CYMBALTA) 20 MG capsule Take 1 capsule by mouth Daily. 23   Madhav Odonnell MD   lidocaine-prilocaine (EMLA) 2.5-2.5 % cream Apply 1 application topically to the appropriate area as directed Every 2 (Two) Hours As Needed for Mild Pain. 23   Nancy Aiken APRN   nitrofurantoin, macrocrystal-monohydrate, (Macrobid) 100 MG capsule Take 1 capsule by mouth 2 (Two) Times a Day. 23   Arian Ching MD   ondansetron ODT (ZOFRAN-ODT) 8 MG disintegrating tablet Place 1 tablet on the tongue Every 8 (Eight) Hours As Needed for Nausea or Vomiting. 23   Madhav Odonnell MD   phenazopyridine (Pyridium) 100 MG tablet Take 1 tablet by mouth 3 (Three) Times a Day As Needed for Bladder Spasms. 23   Arian Ching MD   prochlorperazine (COMPAZINE) 10 MG tablet Take 1 tablet by mouth Every 6 (Six) Hours As Needed for Nausea or Vomiting. 23   Madhav Odonnell MD   traMADol (ULTRAM) 50 MG tablet Take 1 tablet by mouth Every 6 (Six) Hours As Needed for Moderate Pain.    Provider, MD Minda        Social History:   Social History     Tobacco Use    Smoking status: Former     Types: Cigarettes     Quit date: 2022     Years since quittin.9     Passive exposure: Past    Smokeless tobacco: Never   Vaping Use    Vaping Use: Never used   Substance Use Topics    Alcohol use: Not Currently    Drug use: Never         Review of  "Systems:  Review of Systems   Constitutional:  Negative for chills and fever.   HENT:  Negative for congestion, rhinorrhea and sore throat.    Eyes:  Negative for photophobia.   Respiratory:  Negative for apnea, cough, chest tightness and shortness of breath.    Cardiovascular:  Negative for chest pain and palpitations.   Gastrointestinal:  Positive for abdominal pain and nausea. Negative for blood in stool, constipation, diarrhea and vomiting.   Endocrine: Negative.    Genitourinary:  Negative for difficulty urinating and dysuria.   Musculoskeletal:  Negative for back pain, joint swelling and myalgias.   Skin:  Negative for color change and wound.   Allergic/Immunologic: Negative.    Neurological:  Negative for seizures and headaches.   Psychiatric/Behavioral: Negative.     All other systems reviewed and are negative.     Physical Exam:  /64 (BP Location: Left arm, Patient Position: Sitting)   Pulse 88   Temp 98.9 °F (37.2 °C) (Oral)   Resp 20   Ht 157.5 cm (62\")   Wt 78.9 kg (174 lb)   SpO2 94%   BMI 31.83 kg/m²     Physical Exam  Vitals and nursing note reviewed.   Constitutional:       General: She is awake.      Appearance: Normal appearance.   HENT:      Head: Normocephalic and atraumatic.      Nose: Nose normal.      Mouth/Throat:      Comments: Dry mucous membranes  Eyes:      Extraocular Movements: Extraocular movements intact.      Pupils: Pupils are equal, round, and reactive to light.   Cardiovascular:      Rate and Rhythm: Normal rate and regular rhythm.      Heart sounds: Normal heart sounds.   Pulmonary:      Effort: Pulmonary effort is normal. No respiratory distress.      Breath sounds: Normal breath sounds. No wheezing, rhonchi or rales.   Abdominal:      General: Bowel sounds are normal.      Palpations: Abdomen is soft.      Tenderness: There is abdominal tenderness in the right upper quadrant. There is no guarding or rebound. Negative signs include Lees's sign.      Comments: No " "rigidity   Musculoskeletal:         General: No tenderness. Normal range of motion.      Cervical back: Normal range of motion and neck supple.   Skin:     General: Skin is warm and dry.      Coloration: Skin is not jaundiced.   Neurological:      General: No focal deficit present.      Mental Status: Mental status is at baseline.   Psychiatric:         Mood and Affect: Mood normal.                Procedures:  Procedures      Medical Decision Making:      Comorbidities that affect care:    Pancreatic cancer , degenerative disc disease    External Notes reviewed:    Previous Clinic Note: Office visit with surgical oncology, [facility] physicians on 2023.  Description: Pancreatic cancer.  \"Patient: Francisca Fields   Age: 62 y.o.  Sex: female  : 1960  MRN: [id number]  Visit Type: Post-Op     Oncologic History:  1. Pancreatic cancer in head of pancreas  2. Neoadjuvant gem/abraxane (Dr Odonnell)  3. 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 Fields is here for postop visit. She has a lot of issues. I spoken to the family 3 times about pain and being depressed. She is suffering from insomnia and we are trying some melatonin. She is having some continued nausea and has a poor appetite. She will no longer take the Lovenox shots. Her family asked if we try Eliquis and she agrees to this. We did discuss there are no trials to prove that Eliquis is as effective as prophylactic Lovenox to prevent postoperative blood clots. They understand this but would like a 2-week prescription for some Eliquis. She also is asking for some Percocet 10 mg.    Synoptic Report:   Specimen   Procedure: Pancreaticoduodenectomy (Whipple resection), partial   pancreatectomy   Tumor   Tumor Site: Pancreatic head   Histologic Type: Adenosquamous carcinoma   Histologic Type Comment: With a minor sarcomatous component comprising   about 10% of the tumor   Histologic " Grade: G3, poorly differentiated   Tumor Size: 3 Centimeters (cm)   Site(s) Involved by Direct Tumor Extension: Ampulla of Vater or sphincter   of Oddi; Duodenal wall; Peripancreatic soft tissues   Treatment Effect: Absent, with extensive residual cancer and no evident   tumor regression (poor or no response, score 3)   Lymphovascular Invasion: Present   Perineural Invasion: Present   Margins   Margin Status for Invasive Carcinoma: All margins negative for invasive   carcinoma   Closest Margin(s) to Invasive Carcinoma: Uncinate (retroperitoneal /   superior mesenteric artery)   Distance from Invasive Carcinoma to Closest Margin: 0.25 cm   Margin Status for Dysplasia and Intraepithelial Neoplasia: All margins   negative for dysplasia and intraepithelial neoplasia   Regional Lymph Nodes   Regional Lymph Node Status:   Regional Lymph Node Status: Tumor present in regional lymph node(s)   Number of Lymph Nodes with Tumor: 7   Number of Lymph Nodes Examined: 39   Distant Metastasis   Distant Site(s) Involved: Not applicable   Pathologic Stage Classification (pTNM, AJCC 8th Edition)   TNM Descriptors: y (post-treatment)   pT Category: pT2   pN Category: pN2   pM Category: Not applicable - pM cannot be determined from the submitted   specimen(s)   Best Tumor Blocks for Future Studies   Tumor Block(s): D13, D18   Normal Block(s): D5   Past Medical, Social and Family History:   Reviewed and updated        Review of Systems:   Review of Systems     Vitals:   There were no vitals filed for this visit.    Physical Exam:   Physical Exam   Abdomen soft and nontender incisions well-healed    Visit Diagnoses:   No diagnosis found.  Assessment, Management and Plan: She really has some chronic pain issues. I Afia give her 1 more prescription but I think that she needs to continue on locally with pain meds. I think she is going to need to consider a change in her chemotherapy as there pathology report showed no evidence of tumor  "regression.\"    Medical Decision Making    Corbin Bush MD  [facility]  9/22/2023           The following orders were placed and all results were independently analyzed by me:  Orders Placed This Encounter   Procedures    Urine Culture - Urine,    CT Chest With Contrast Diagnostic    CT Abdomen Pelvis With Contrast    Comprehensive Metabolic Panel    Lipase    Urinalysis With Culture If Indicated - Urine, Clean Catch    CBC Auto Differential    Urinalysis, Microscopic Only - Urine, Clean Catch    Ambulatory Referral to Home Hospice    IP General Consult (Use specialty-specific consult if known)    Insert Peripheral IV    CBC & Differential    Extra Tubes    Green Top (Gel)       Medications Given in the Emergency Department:  Medications   sodium chloride 0.9 % flush 10 mL (has no administration in time range)   cefTRIAXone (ROCEPHIN) IVPB 1,000 mg (1,000 mg Intravenous New Bag 9/30/23 0545)   sodium chloride 0.9 % bolus 1,000 mL (0 mL Intravenous Stopped 9/30/23 0500)   HYDROmorphone (DILAUDID) injection 0.5 mg (0.5 mg Intravenous Given 9/30/23 0414)   ondansetron (ZOFRAN) injection 4 mg (4 mg Intravenous Given 9/30/23 0414)   iopamidol (ISOVUE-370) 76 % injection 100 mL (100 mL Intravenous Given 9/30/23 0431)        ED Course:    ED Course as of 09/30/23 0557   Sat Sep 30, 2023   0544 Long discussion with patient and family at bedside.  Patient wants to pursue no further treatment for her cancer and wants to go home with plan for outpatient hospice care.  Her only request is pain control at this time.  Family is in agreement with this plan.  They are aware that CT findings could represent abscess but patient clearly states that no matter the results she would not elect to have any further surgical intervention, radiation or chemotherapy of any kind. [RP]      ED Course User Index  [RP] Ash Albright MD       Labs:    Lab Results (last 24 hours)       Procedure Component Value " Units Date/Time    Urinalysis With Culture If Indicated - Urine, Clean Catch [[id number]]  (Abnormal) Collected: 09/30/23 0334    Specimen: Urine, Clean Catch Updated: 09/30/23 0415     Color, UA Dark Yellow     Appearance, UA Turbid     pH, UA 5.5     Specific Gravity, UA >=1.030     Glucose, UA Negative     Ketones, UA Trace     Bilirubin, UA Small (1+)     Blood, UA Small (1+)     Protein, UA 30 mg/dL (1+)     Leuk Esterase, UA Moderate (2+)     Nitrite, UA Positive     Urobilinogen, UA 1.0 E.U./dL    Narrative:      In absence of clinical symptoms, the presence of pyuria, bacteria, and/or nitrites on the urinalysis result does not correlate with infection.    Urinalysis, Microscopic Only - Urine, Clean Catch [[id number]]  (Abnormal) Collected: 09/30/23 0334    Specimen: Urine, Clean Catch Updated: 09/30/23 0430     RBC, UA 0-2 /HPF      WBC, UA Too Numerous to Count /HPF      Bacteria, UA 4+ /HPF      Squamous Epithelial Cells, UA 3-6 /HPF      Hyaline Casts, UA 0-2 /LPF      Calcium Oxalate Crystals, UA Small/1+ /HPF      Methodology Manual Light Microscopy    Urine Culture - Urine, Urine, Clean Catch [[id number]] Collected: 09/30/23 0334    Specimen: Urine, Clean Catch Updated: 09/30/23 0430    CBC & Differential [[id number]]  (Abnormal) Collected: 09/30/23 0335    Specimen: Blood from Arm, Right Updated: 09/30/23 0416    Narrative:      The following orders were created for panel order CBC & Differential.  Procedure                               Abnormality         Status                     ---------                               -----------         ------                     CBC Auto Differential[[id number]]        Abnormal            Final result               Scan Slide[[id number]]                                                                    Please view results for these tests on the individual orders.    Lipase [[id number]]  (Abnormal) Collected: 09/30/23 0335    Specimen: Blood from Arm, Right Updated:  09/30/23 0414     Lipase 11 U/L     CBC Auto Differential [[id number]]  (Abnormal) Collected: 09/30/23 0335    Specimen: Blood from Arm, Right Updated: 09/30/23 0416     WBC 21.60 10*3/mm3      RBC 3.34 10*6/mm3      Hemoglobin 9.4 g/dL      Hematocrit 30.0 %      MCV 89.8 fL      MCH 28.1 pg      MCHC 31.3 g/dL      RDW 16.8 %      RDW-SD 55.2 fl      MPV 9.8 fL      Platelets 556 10*3/mm3      Neutrophil % 80.0 %      Lymphocyte % 9.0 %      Monocyte % 7.0 %      Eosinophil % 2.5 %      Basophil % 0.3 %      Immature Grans % 1.2 %      Neutrophils, Absolute 17.27 10*3/mm3      Lymphocytes, Absolute 1.95 10*3/mm3      Monocytes, Absolute 1.52 10*3/mm3      Eosinophils, Absolute 0.54 10*3/mm3      Basophils, Absolute 0.07 10*3/mm3      Immature Grans, Absolute 0.25 10*3/mm3      nRBC 0.0 /100 WBC     Comprehensive Metabolic Panel [[id number]]  (Abnormal) Collected: 09/30/23 0419    Specimen: Blood from Arm, Right Updated: 09/30/23 0458     Glucose 140 mg/dL      BUN 13 mg/dL      Creatinine 0.67 mg/dL      Sodium 135 mmol/L      Potassium 3.7 mmol/L      Comment: Slight hemolysis detected by analyzer. Results may be affected.        Chloride 102 mmol/L      CO2 23.6 mmol/L      Calcium 8.8 mg/dL      Total Protein 6.3 g/dL      Albumin 2.5 g/dL      ALT (SGPT) 9 U/L      AST (SGOT) 22 U/L      Alkaline Phosphatase 71 U/L      Total Bilirubin 0.2 mg/dL      Globulin 3.8 gm/dL      A/G Ratio 0.7 g/dL      BUN/Creatinine Ratio 19.4     Anion Gap 9.4 mmol/L      eGFR 99.0 mL/min/1.73     Narrative:      GFR Normal >60  Chronic Kidney Disease <60  Kidney Failure <15               Imaging:    CT Chest With Contrast Diagnostic    Result Date: 9/30/2023  PROCEDURE: CT CHEST W CONTRAST DIAGNOSTIC  COMPARISONS: 9/30/2023; 8/23/2023.  INDICATIONS: 62-year-old female w/ h/o chest pain; h/o pancreatic cancer.  TECHNIQUE: After obtaining the patient's consent, 575 CT images were obtained with non-ionic intravenous contrast  material.   PROTOCOL:   Pulmonary embolism CTA imaging protocol performed.    RADIATION:   Total DLP: 2,028.9 mGy*cm.   Automated exposure control was utilized to minimize radiation dose. CONTRAST: 100 mL Isovue 370 I.V.  FINDINGS: No pulmonary embolism is seen.  There are new tiny bilateral pleural effusions, larger on the right.  New or increased subsegmental atelectasis and/or infiltrate(s) is (are) seen in the lung bases.  Atelectasis is favored.  The central tracheobronchial tree is well aerated without filling defect.  There is a right-sided central venous line in place with its distal tip in the upper superior vena cava (SVC).  Low lung volumes are present.  No cardiac enlargement.  No enlarged mediastinal, hilar, or axillary lymph nodes are seen.  No thoracic aortic aneurysm or dissection is identified.  No acute fracture or aggressive osseous lesion.  There may be mild dextroscoliosis of the upper thoracic spine.  Please see the separate abdomen/pelvis CT exam report for further detail regarding important findings in the abdomen and pelvis.       No pulmonary embolism is identified.  New bibasilar subsegmental atelectasis is suspected.  Acute infiltrates as with pneumonia or pulmonary edema are thought to be less likely.  There are new tiny bilateral pleural effusions, larger on the right.  No cardiac enlargement.  There is probably no significant change in the right central venous line position.     Please note that portions of this note were completed with a voice recognition program.  ARMANDO HADLEY JR, MD       Electronically Signed and Approved By: ARMANDO HADLEY JR, MD on 9/30/2023 at 4:57              CT Abdomen Pelvis With Contrast    Result Date: 9/30/2023  PROCEDURE: CT ABDOMEN PELVIS W CONTRAST  COMPARISONS: 9/30/2023; 8/23/2023.  INDICATIONS: 62-year-old female w/ h/o abdominal pain & pancreatic carcinoma.  TECHNIQUE: After obtaining the patient's consent, CT images were created with non-ionic  intravenous contrast material.  No oral contrast agent was administered for the study.  PROTOCOL:   Standard CT imaging protocol performed.    RADIATION:   Total DLP: 2,028.9 mGy*cm.   Automated exposure control was utilized to minimize radiation dose. CONTRAST: 100 mL Isovue 370 I.V.  FINDINGS: New hepatic metastases are seen.  These findings are estimated at approximately 3 cm in greatest axial diameter.  They are probably innumerable.  They appear nonhemorrhagic.  There is pneumobilia.  A pancreatic ductal stent is in place.  It appears that the patient has undergone some type pancreaticoduodenectomy and gastrojejunostomy, probably a type of Whipple procedure.  Please correlate with the postoperative anatomy.  There are focal areas of low attenuation in the upper abdomen, especially near the bert hepatis and inferior to the remaining distal pancreas, which may represent normal postoperative fluid collections.  They have CT numbers ranging between 10 and 30 Hounsfield units.  Necrotic masses or necrotic lymph nodes cannot be excluded but are thought to be less likely differential possibilities.  Abscesses cannot be excluded.  Pancreatic pseudocysts would also be in the differential diagnosis.  These findings do not contain gas.  At least 3 such findings are seen.  They measure about 4 cm each in greatest axial diameter.  It is uncertain by this exam as to whether not these findings communicate with one another.  Probably no unintended retained foreign body is identified.  There may be are several small to moderate upper abdominal lymph nodes, especially in the region of the gastrohepatic ligament, paragastric regions, and the central mesentery.  Metastatic adenopathy cannot be excluded.  The previously seen biliary stent is no longer identified.  Otherwise, no acute findings are seen.  The other findings are as described in prior CT exam reports.        Postoperative changes suggestive of pancreaticoduodenectomy  and gastrojejunostomy as with a Whipple procedure or modified Whipple procedure are thought to be new findings since the 8/23/2023 CT study.  Please correlate with the surgical history.  The distal pancreas remains in place.  A pancreatic ductal stent is also seen.  New innumerable hepatic metastases are present, which measure about 3 cm or less in axial diameter.  They are fluid collections in the upper abdomen, which may represent normal postoperative fluid collections.  Please correlate clinically.  Please see above comments for further detail.   Please note that portions of this note were completed with a voice recognition program.  ARMANDO HADLEY JR, MD       Electronically Signed and Approved By: ARMANDO HADLEY JR, MD on 9/30/2023 at 5:17                 Differential Diagnosis and Discussion:    Abdominal Pain: Based on the patient's signs and symptoms, I considered abdominal aortic aneurysm, small bowel obstruction, pancreatitis, acute cholecystitis, acute appendecitis, peptic ulcer disease, gastritis, colitis, endocrine disorders, irritable bowel syndrome and other differential diagnosis an etiology of the patient's abdominal pain.    All labs were reviewed and interpreted by me.  CT scan radiology impression was interpreted by me.    MDM     Amount and/or Complexity of Data Reviewed  Clinical lab tests: reviewed  Tests in the radiology section of CPT®: reviewed  Decide to obtain previous medical records or to obtain history from someone other than the patient: yes             Patient Care Considerations:    CONSULT: I considered consulting surgical oncology at [facility], however patient declines any further medical or surgical management of her pancreatic cancer.      Consultants/Shared Management Plan:    None    Social Determinants of Health:    Patient has presented with family members who are responsible, reliable and will ensure follow up care.      Disposition and Care  Coordination:    Discharged: I considered escalation of care by admitting this patient to the hospital, however the patient has improved and is suitable and stable for discharge.    I have explained the patient´s condition, diagnoses and treatment plan based on the information available to me at this time. I have answered questions and addressed any concerns. The patient has a good  understanding of the patient´s diagnosis, condition, and treatment plan as can be expected at this point. The vital signs have been stable. The patient´s condition is stable and appropriate for discharge from the emergency department.      The patient will pursue further outpatient evaluation with the primary care physician or other designated or consulting physician as outlined in the discharge instructions. They are agreeable to this plan of care and follow-up instructions have been explained in detail. The patient has received these instructions in written format and have expressed an understanding of the discharge instructions. The patient is aware that any significant change in condition or worsening of symptoms should prompt an immediate return to this or the closest emergency department or call to 911.    Final diagnoses:   Cancer related pain   Malignant neoplasm of head of pancreas   Acute UTI        ED Disposition       ED Disposition   Discharge    Condition   Stable    Comment   --               This medical record created using voice recognition software.             Ash Albright MD  09/30/23 0557

## 2023-10-02 ENCOUNTER — TELEPHONE (OUTPATIENT)
Dept: ONCOLOGY | Facility: HOSPITAL | Age: 63
End: 2023-10-02
Payer: COMMERCIAL

## 2023-10-02 LAB — BACTERIA SPEC AEROBE CULT: ABNORMAL

## 2023-10-02 NOTE — TELEPHONE ENCOUNTER
OSW contacted [facility] and learned they were needing the patient's last office note and most recent lab results.  OSW faxed emergency room visit to hospitals with the latest labs.

## 2023-10-02 NOTE — TELEPHONE ENCOUNTER
Alison called and states that the pt was in the [facility] ER Saturday for severe pain and the pt was referred to [facility]. Unique states that [facility] is needing info from our office. Unique states that she has been speaking to Gurdeep at [facility]. Can [facility] call [facility] and assist them please.

## 2023-10-03 NOTE — TELEPHONE ENCOUNTER
OSW received confirmation from [facility] that Ms. Fields admitted with hospice care on 10/2/23.

## 2023-10-04 ENCOUNTER — TELEPHONE (OUTPATIENT)
Dept: ONCOLOGY | Facility: HOSPITAL | Age: 63
End: 2023-10-04
Payer: COMMERCIAL

## 2023-10-04 NOTE — TELEPHONE ENCOUNTER
Ms. Fields and family presented to the [facility] this morning to attend follow-up visit with medical oncologist, Dr. Odonnell, to review some test results and further discuss prognosis and life expectancy. [facility]W contacted [facility] to receive approval for Ms. Fields to be seen by medical oncologist this morning, as she had admitted with hospice care on 10/2/23. [facility] reported they were not aware of this appointment and it had not been pre-authorized. They will make their pre-authorization staff aware and be back in contact shortly.    In the interim, Ms. Fields and  departed the [facility] due to Ms. Fields not feeling well. Alternatively, she's requested provider to call her with results.     OSW shortly after received a response from [facility] that after speaking with the team, the nurse was aware and the MD that was on call when Ms. Fields was admitted to hospice had approved the visit to see Dr. Odonnell today. Alteratively, they will also approve a telehealth visit if that is a preferred route opted by the provider and patient. OSW made the registrars and medical staff aware to further coordinate. Clinic staff advised medical oncologist will be contacting Ms. Fields via telephone. [facility]W support remains available.

## 2023-10-13 ENCOUNTER — TELEPHONE (OUTPATIENT)
Dept: ONCOLOGY | Facility: HOSPITAL | Age: 63
End: 2023-10-13

## 2023-10-13 NOTE — TELEPHONE ENCOUNTER
Caller: BART ACEVEDO    Relationship: Emergency Contact    Best call back number: [phone]    What is the best time to reach you: ANY    Who are you requesting to speak with (clinical staff, provider,  specific staff member): CLINICAL     What was the call regarding: BART IS WANTING TO  A COPY OF THE GENETIC TESTING THAT [facility]U HAD DONE IN JUNE    PLEASE CALL WHEN READY TO